# Patient Record
Sex: FEMALE | Race: WHITE | NOT HISPANIC OR LATINO | Employment: UNEMPLOYED | URBAN - METROPOLITAN AREA
[De-identification: names, ages, dates, MRNs, and addresses within clinical notes are randomized per-mention and may not be internally consistent; named-entity substitution may affect disease eponyms.]

---

## 2017-04-10 ENCOUNTER — ALLSCRIPTS OFFICE VISIT (OUTPATIENT)
Dept: OTHER | Facility: OTHER | Age: 52
End: 2017-04-10

## 2017-05-31 ENCOUNTER — APPOINTMENT (OUTPATIENT)
Dept: LAB | Facility: MEDICAL CENTER | Age: 52
End: 2017-05-31
Payer: COMMERCIAL

## 2017-05-31 DIAGNOSIS — E78.5 HYPERLIPIDEMIA: ICD-10-CM

## 2017-05-31 DIAGNOSIS — E03.9 HYPOTHYROIDISM: ICD-10-CM

## 2017-05-31 LAB
ALBUMIN SERPL BCP-MCNC: 3.7 G/DL (ref 3.5–5)
ALP SERPL-CCNC: 59 U/L (ref 46–116)
ALT SERPL W P-5'-P-CCNC: 30 U/L (ref 12–78)
ANION GAP SERPL CALCULATED.3IONS-SCNC: 3 MMOL/L (ref 4–13)
AST SERPL W P-5'-P-CCNC: 17 U/L (ref 5–45)
BILIRUB SERPL-MCNC: 0.51 MG/DL (ref 0.2–1)
BUN SERPL-MCNC: 11 MG/DL (ref 5–25)
CALCIUM SERPL-MCNC: 9.1 MG/DL (ref 8.3–10.1)
CHLORIDE SERPL-SCNC: 104 MMOL/L (ref 100–108)
CHOLEST SERPL-MCNC: 228 MG/DL (ref 50–200)
CO2 SERPL-SCNC: 30 MMOL/L (ref 21–32)
CREAT SERPL-MCNC: 0.69 MG/DL (ref 0.6–1.3)
GFR SERPL CREATININE-BSD FRML MDRD: >60 ML/MIN/1.73SQ M
GLUCOSE P FAST SERPL-MCNC: 94 MG/DL (ref 65–99)
HDLC SERPL-MCNC: 48 MG/DL (ref 40–60)
LDLC SERPL CALC-MCNC: 147 MG/DL (ref 0–100)
POTASSIUM SERPL-SCNC: 4.3 MMOL/L (ref 3.5–5.3)
PROT SERPL-MCNC: 7.7 G/DL (ref 6.4–8.2)
SODIUM SERPL-SCNC: 137 MMOL/L (ref 136–145)
TRIGL SERPL-MCNC: 166 MG/DL
TSH SERPL DL<=0.05 MIU/L-ACNC: 2.26 UIU/ML (ref 0.36–3.74)

## 2017-05-31 PROCEDURE — 80061 LIPID PANEL: CPT

## 2017-05-31 PROCEDURE — 80053 COMPREHEN METABOLIC PANEL: CPT

## 2017-05-31 PROCEDURE — 84443 ASSAY THYROID STIM HORMONE: CPT

## 2017-05-31 PROCEDURE — 36415 COLL VENOUS BLD VENIPUNCTURE: CPT

## 2017-08-17 ENCOUNTER — ALLSCRIPTS OFFICE VISIT (OUTPATIENT)
Dept: OTHER | Facility: OTHER | Age: 52
End: 2017-08-17

## 2017-11-01 DIAGNOSIS — Z12.31 ENCOUNTER FOR SCREENING MAMMOGRAM FOR MALIGNANT NEOPLASM OF BREAST: ICD-10-CM

## 2017-11-01 DIAGNOSIS — E66.01 MORBID (SEVERE) OBESITY DUE TO EXCESS CALORIES (HCC): ICD-10-CM

## 2017-11-01 DIAGNOSIS — E78.5 HYPERLIPIDEMIA: ICD-10-CM

## 2017-11-01 DIAGNOSIS — E03.9 HYPOTHYROIDISM: ICD-10-CM

## 2017-11-03 ENCOUNTER — HOSPITAL ENCOUNTER (OUTPATIENT)
Dept: RADIOLOGY | Facility: MEDICAL CENTER | Age: 52
Discharge: HOME | End: 2017-11-03
Payer: COMMERCIAL

## 2017-11-03 DIAGNOSIS — Z12.31 ENCOUNTER FOR SCREENING MAMMOGRAM FOR MALIGNANT NEOPLASM OF BREAST: ICD-10-CM

## 2017-11-03 PROCEDURE — G0202 SCR MAMMO BI INCL CAD: HCPCS

## 2018-01-12 NOTE — RESULT NOTES
Verified Results  (1) LYME ANTIBODY PROFILE W/REFLEX TO WESTERN BLOT [id number] 11:31AM Dara Liz Order Number: [id number]     Test Name Result Flag Reference   LYME IGG 0 17  0 00-0 79   NEGATIVE(0 00-0 79)-Absence of detectable Borrelia IgG Antibodies  A negative result does not exclude the possibility of Borrelia infection  If early Lyme disease is suspected,a second sample should be collected & tested 4 weeks after initial testing  LYME IGM 0 26  0 00-0 79   NEGATIVE (0 00-0 79)-Absence of detectable Borrelia IgM antibodies  A negative result does not exclude the possibility of Borrelia infection  If early lyme disease is suspected, a second sample should be collected & tested 4 weeks after initial testing

## 2018-01-13 VITALS
HEART RATE: 104 BPM | DIASTOLIC BLOOD PRESSURE: 84 MMHG | TEMPERATURE: 97.9 F | SYSTOLIC BLOOD PRESSURE: 140 MMHG | BODY MASS INDEX: 49.89 KG/M2 | OXYGEN SATURATION: 97 % | RESPIRATION RATE: 16 BRPM | WEIGHT: 292.2 LBS | HEIGHT: 64 IN

## 2018-01-13 VITALS
HEIGHT: 64 IN | DIASTOLIC BLOOD PRESSURE: 90 MMHG | SYSTOLIC BLOOD PRESSURE: 140 MMHG | BODY MASS INDEX: 50.02 KG/M2 | WEIGHT: 293 LBS

## 2018-02-12 DIAGNOSIS — E03.9 HYPOTHYROIDISM, UNSPECIFIED TYPE: Primary | ICD-10-CM

## 2018-02-12 RX ORDER — LEVOTHYROXINE SODIUM 88 UG/1
TABLET ORAL
Qty: 30 TABLET | Refills: 1 | Status: SHIPPED | OUTPATIENT
Start: 2018-02-12 | End: 2018-10-10 | Stop reason: SDUPTHER

## 2018-04-16 ENCOUNTER — ANNUAL EXAM (OUTPATIENT)
Dept: OBGYN CLINIC | Facility: MEDICAL CENTER | Age: 53
End: 2018-04-16
Payer: COMMERCIAL

## 2018-04-16 VITALS
BODY MASS INDEX: 48.82 KG/M2 | HEIGHT: 65 IN | DIASTOLIC BLOOD PRESSURE: 92 MMHG | SYSTOLIC BLOOD PRESSURE: 148 MMHG | WEIGHT: 293 LBS

## 2018-04-16 DIAGNOSIS — Z01.419 ENCNTR FOR GYN EXAM (GENERAL) (ROUTINE) W/O ABN FINDINGS: ICD-10-CM

## 2018-04-16 DIAGNOSIS — Z12.31 ENCOUNTER FOR SCREENING MAMMOGRAM FOR MALIGNANT NEOPLASM OF BREAST: ICD-10-CM

## 2018-04-16 PROCEDURE — S0612 ANNUAL GYNECOLOGICAL EXAMINA: HCPCS | Performed by: PHYSICIAN ASSISTANT

## 2018-04-16 RX ORDER — CHLORAL HYDRATE 500 MG
1000 CAPSULE ORAL DAILY
COMMUNITY
End: 2022-03-29

## 2018-04-16 RX ORDER — LOVASTATIN 10 MG/1
TABLET ORAL
COMMUNITY
Start: 2018-03-03 | End: 2018-09-14 | Stop reason: SDUPTHER

## 2018-04-16 NOTE — PROGRESS NOTES
Jessie Pate Markleton  1965      CC:  Yearly exam    S:  46 y o  female here for yearly exam  Her cycles are regular, not heavy or crampy  She is sexually active  She uses nothing for contraception  Last Pap 2015 neg/neg  Last Mammo 11/3/17 neg  Last colonoscopy 2016 neg      Current Outpatient Prescriptions:     cholecalciferol (VITAMIN D3) 1,000 units tablet, Take by mouth, Disp: , Rfl:     Coenzyme Q10 (CO Q 10) 100 MG CAPS, Take by mouth, Disp: , Rfl:     levothyroxine 88 mcg tablet, TAKE 1 TABLET BY MOUTH ONCE DAILY, Disp: 30 tablet, Rfl: 1    lovastatin (MEVACOR) 10 MG tablet, , Disp: , Rfl:     MULTIPLE VITAMIN-FOLIC ACID PO, Take by mouth, Disp: , Rfl:     Omega-3 Fatty Acids (FISH OIL) 1,000 mg, Take 1,000 mg by mouth daily, Disp: , Rfl:   Social History     Social History    Marital status: /Civil Union     Spouse name: N/A    Number of children: N/A    Years of education: N/A     Occupational History    Not on file       Social History Main Topics    Smoking status: Never Smoker    Smokeless tobacco: Never Used    Alcohol use Yes      Comment: SOCIAL    Drug use: No    Sexual activity: Yes     Partners: Male     Other Topics Concern    Not on file     Social History Narrative    DAILY COFFEE CONSUMPTION (1 CUPS/DAY)    USES SAFETY EQUIPMENT - [employer]     Family History   Problem Relation Age of Onset   Hortencia Millernce Breast cancer Mother     Hypertension Father     Colon cancer Sister     Heart disease Sister     Hypertension Sister     Diabetes Maternal Grandmother     Arthritis Family     Leukemia Family     Lung cancer Family     Other Family      SKIN DISORDER    Thyroid disease Family     Uterine cancer Family     Leukemia Sister     Cervical cancer Sister       Past Medical History:   Diagnosis Date    Arthritis     Hypercholesterolemia     Hypothyroidism         O:  Blood pressure 148/92, height 5' 4 57" (1 64 m), weight (!) 137 kg (303 lb), last menstrual period 03/18/2018  Patient appears well and is not in distress  Neck is supple without masses  Breasts are symmetrical without mass, tenderness, nipple discharge, skin changes or adenopathy  Abdomen is soft and nontender without masses  External genitals are normal without lesions or rashes  Vagina is normal without discharge or bleeding  Cervix is normal without discharge or lesion  Uterus is normal, mobile, nontender without palpable mass  Adnexa are normal, nontender, without palpable mass  A:  Yearly exam      P:   Pap due 2020   Mammo slip provided    RTO one year for yearly exam or sooner as needed

## 2018-05-04 ENCOUNTER — APPOINTMENT (OUTPATIENT)
Dept: LAB | Facility: MEDICAL CENTER | Age: 53
End: 2018-05-04
Payer: COMMERCIAL

## 2018-05-04 DIAGNOSIS — E03.9 HYPOTHYROIDISM: ICD-10-CM

## 2018-05-04 DIAGNOSIS — E78.5 HYPERLIPIDEMIA: ICD-10-CM

## 2018-05-04 DIAGNOSIS — E66.01 MORBID (SEVERE) OBESITY DUE TO EXCESS CALORIES (HCC): ICD-10-CM

## 2018-05-04 LAB
ALBUMIN SERPL BCP-MCNC: 3.8 G/DL (ref 3.5–5)
ALP SERPL-CCNC: 58 U/L (ref 46–116)
ALT SERPL W P-5'-P-CCNC: 26 U/L (ref 12–78)
ANION GAP SERPL CALCULATED.3IONS-SCNC: 7 MMOL/L (ref 4–13)
AST SERPL W P-5'-P-CCNC: 16 U/L (ref 5–45)
BILIRUB SERPL-MCNC: 0.67 MG/DL (ref 0.2–1)
BUN SERPL-MCNC: 9 MG/DL (ref 5–25)
CALCIUM SERPL-MCNC: 8.6 MG/DL (ref 8.3–10.1)
CHLORIDE SERPL-SCNC: 101 MMOL/L (ref 100–108)
CHOLEST SERPL-MCNC: 175 MG/DL (ref 50–200)
CO2 SERPL-SCNC: 28 MMOL/L (ref 21–32)
CREAT SERPL-MCNC: 0.68 MG/DL (ref 0.6–1.3)
GFR SERPL CREATININE-BSD FRML MDRD: 101 ML/MIN/1.73SQ M
GLUCOSE P FAST SERPL-MCNC: 89 MG/DL (ref 65–99)
HDLC SERPL-MCNC: 39 MG/DL (ref 40–60)
LDLC SERPL CALC-MCNC: 100 MG/DL (ref 0–100)
NONHDLC SERPL-MCNC: 136 MG/DL
POTASSIUM SERPL-SCNC: 4 MMOL/L (ref 3.5–5.3)
PROT SERPL-MCNC: 7.6 G/DL (ref 6.4–8.2)
SODIUM SERPL-SCNC: 136 MMOL/L (ref 136–145)
TRIGL SERPL-MCNC: 182 MG/DL
TSH SERPL DL<=0.05 MIU/L-ACNC: 2.53 UIU/ML (ref 0.36–3.74)

## 2018-05-04 PROCEDURE — 36415 COLL VENOUS BLD VENIPUNCTURE: CPT

## 2018-05-04 PROCEDURE — 80053 COMPREHEN METABOLIC PANEL: CPT

## 2018-05-04 PROCEDURE — 84443 ASSAY THYROID STIM HORMONE: CPT

## 2018-05-04 PROCEDURE — 80061 LIPID PANEL: CPT

## 2018-05-07 ENCOUNTER — TELEPHONE (OUTPATIENT)
Dept: FAMILY MEDICINE CLINIC | Facility: CLINIC | Age: 53
End: 2018-05-07

## 2018-05-07 NOTE — TELEPHONE ENCOUNTER
Left message to schedule      ----- Message from Rico Cottrlel PA-C sent at 5/7/2018  8:30 AM EDT -----  Labs   Are in    Pt  Due for  Routine  f/u  appt

## 2018-05-11 ENCOUNTER — OFFICE VISIT (OUTPATIENT)
Dept: FAMILY MEDICINE CLINIC | Facility: CLINIC | Age: 53
End: 2018-05-11
Payer: COMMERCIAL

## 2018-05-11 VITALS
HEIGHT: 65 IN | SYSTOLIC BLOOD PRESSURE: 118 MMHG | TEMPERATURE: 97.2 F | WEIGHT: 293 LBS | DIASTOLIC BLOOD PRESSURE: 80 MMHG | OXYGEN SATURATION: 97 % | BODY MASS INDEX: 48.82 KG/M2 | HEART RATE: 100 BPM

## 2018-05-11 DIAGNOSIS — E78.2 MIXED HYPERLIPIDEMIA: ICD-10-CM

## 2018-05-11 DIAGNOSIS — E03.9 HYPOTHYROIDISM, UNSPECIFIED TYPE: Primary | ICD-10-CM

## 2018-05-11 PROCEDURE — 1036F TOBACCO NON-USER: CPT | Performed by: PHYSICIAN ASSISTANT

## 2018-05-11 PROCEDURE — 99214 OFFICE O/P EST MOD 30 MIN: CPT | Performed by: PHYSICIAN ASSISTANT

## 2018-05-11 NOTE — PROGRESS NOTES
Assessment/Plan:         Diagnoses and all orders for this visit:    Hypothyroidism, unspecified type  Comments:    Continue levothyroxine 88 mcg follow-up 6 months with a TSH  Orders:  -     TSH, 3rd generation; Future    Mixed hyperlipidemia  Comments:    Continue low-fat diet  Hold statin for now patient to call in 2-3 weeks  May need to change to Crestor  Orders:  -     Comprehensive metabolic panel; Future  -     Lipid panel; Future          Subjective:      Patient ID: Aby Gonzalez is a 46 y o  female  Patient presents for follow up chronic conditions  Patient has hyper lipidemia  She is currently on lovastatin 10 mg  Patient states she started with some myalgias in her arms and legs and stop her lovastatin last week patient has noticed interval improvement in the aches  Patient to stay off lovastatin for now call in a week or 2  May try lovastatin 3 to 4 times a week or switch to Crestor  Patient is also intolerant to pravastatin  Her lipid panel revealed a total cholesterol 175 triglycerides 182 HDL was 39 and LDL was 100 CMP was normal   The patient also has hypothyroidism she is on levothyroxine 88 mcg  Her current TSH is at goal         The following portions of the patient's history were reviewed and updated as appropriate:   She  has a past medical history of Arthritis; Hypercholesterolemia; and Hypothyroidism    She   Patient Active Problem List    Diagnosis Date Noted    Morbid obesity ([facility] [address] ) 05/19/2016    Multiple joint pain 05/19/2016    Hyperlipidemia 05/07/2013    Hypothyroidism 05/07/2013    Vitamin D deficiency 05/07/2013     Current Outpatient Prescriptions   Medication Sig Dispense Refill    cholecalciferol (VITAMIN D3) 1,000 units tablet Take by mouth      Coenzyme Q10 (CO Q 10) 100 MG CAPS Take by mouth      levothyroxine 88 mcg tablet TAKE 1 TABLET BY MOUTH ONCE DAILY 30 tablet 1    lovastatin (MEVACOR) 10 MG tablet       MULTIPLE VITAMIN-FOLIC ACID PO Take by mouth      Omega-3 Fatty Acids (FISH OIL) 1,000 mg Take 1,000 mg by mouth daily       No current facility-administered medications for this visit  Current Outpatient Prescriptions on File Prior to Visit   Medication Sig    cholecalciferol (VITAMIN D3) 1,000 units tablet Take by mouth    Coenzyme Q10 (CO Q 10) 100 MG CAPS Take by mouth    levothyroxine 88 mcg tablet TAKE 1 TABLET BY MOUTH ONCE DAILY    lovastatin (MEVACOR) 10 MG tablet     MULTIPLE VITAMIN-FOLIC ACID PO Take by mouth    Omega-3 Fatty Acids (FISH OIL) 1,000 mg Take 1,000 mg by mouth daily     No current facility-administered medications on file prior to visit  She is allergic to penicillins       Review of Systems   Constitutional: Negative for activity change, fatigue, fever and unexpected weight change  HENT: Positive for postnasal drip and sore throat  Negative for ear pain  Respiratory: Negative for cough and shortness of breath  Cardiovascular: Negative for chest pain and leg swelling  Gastrointestinal: Negative for abdominal pain, blood in stool and constipation  Endocrine: Negative for cold intolerance and heat intolerance  Genitourinary: Negative for difficulty urinating  Musculoskeletal: Positive for arthralgias and myalgias  Pt  Held  Her  Statin  Since  friday  Due  To  Arm  Pain  Pt  Noticed  interrval  Improvement  Will  Call in  2  Weeks  May  Need  To try  Different statin   Skin: Negative for rash  Neurological: Negative for dizziness, syncope and headaches  Psychiatric/Behavioral: Negative for sleep disturbance  Objective:      /80 (BP Location: Left arm, Patient Position: Sitting, Cuff Size: Standard)   Pulse 100   Temp (!) 97 2 °F (36 2 °C)   Ht 5' 5" (1 651 m)   Wt (!) 137 kg (302 lb)   SpO2 97%   BMI 50 26 kg/m²          Physical Exam   Constitutional: She is oriented to person, place, and time  She appears well-developed and well-nourished     Morbidly obese  White female   HENT:   Head: Normocephalic and atraumatic  Right Ear: External ear normal    Left Ear: External ear normal    Mouth/Throat: Oropharynx is clear and moist    Eyes: Conjunctivae are normal  Pupils are equal, round, and reactive to light  Neck: Carotid bruit is not present  No thyromegaly present  Cardiovascular: Normal rate and regular rhythm  No murmur heard  Pulmonary/Chest: Effort normal and breath sounds normal    Abdominal: Soft  Bowel sounds are normal  She exhibits mass (umbilical hernia)  Musculoskeletal: She exhibits no edema  Lymphadenopathy:     She has no cervical adenopathy  Neurological: She is alert and oriented to person, place, and time  Skin: Skin is warm and dry  Psychiatric: She has a normal mood and affect  Her behavior is normal  Thought content normal    Nursing note and vitals reviewed

## 2018-09-14 ENCOUNTER — TELEPHONE (OUTPATIENT)
Dept: FAMILY MEDICINE CLINIC | Facility: CLINIC | Age: 53
End: 2018-09-14

## 2018-09-14 DIAGNOSIS — E78.2 MIXED HYPERLIPIDEMIA: Primary | ICD-10-CM

## 2018-09-14 RX ORDER — LOVASTATIN 10 MG/1
10 TABLET ORAL
Qty: 90 TABLET | Refills: 1 | Status: SHIPPED | OUTPATIENT
Start: 2018-09-14 | End: 2019-06-09 | Stop reason: SDUPTHER

## 2018-09-14 NOTE — TELEPHONE ENCOUNTER
Patient doesn't feel like the medication was causing the issues  The problem is seeming to get worse  Pain in legs  What do you suggest she do now

## 2018-09-14 NOTE — TELEPHONE ENCOUNTER
Spoke  With  Pt      She  Has  Been  Off  Statin  For 4  Months  Pain  In  Legs and  Back  Worse  Pt  Advised  F/u  Appt  She  Agrees    Pt  May resume  Lovastatin  10 mg

## 2018-09-21 ENCOUNTER — APPOINTMENT (OUTPATIENT)
Dept: LAB | Facility: MEDICAL CENTER | Age: 53
End: 2018-09-21
Payer: COMMERCIAL

## 2018-09-21 ENCOUNTER — OFFICE VISIT (OUTPATIENT)
Dept: FAMILY MEDICINE CLINIC | Facility: CLINIC | Age: 53
End: 2018-09-21
Payer: COMMERCIAL

## 2018-09-21 VITALS
BODY MASS INDEX: 48.82 KG/M2 | TEMPERATURE: 99.3 F | RESPIRATION RATE: 16 BRPM | DIASTOLIC BLOOD PRESSURE: 74 MMHG | HEIGHT: 65 IN | SYSTOLIC BLOOD PRESSURE: 144 MMHG | WEIGHT: 293 LBS | OXYGEN SATURATION: 99 % | HEART RATE: 88 BPM

## 2018-09-21 DIAGNOSIS — G89.29 CHRONIC PAIN OF MULTIPLE JOINTS: Primary | ICD-10-CM

## 2018-09-21 DIAGNOSIS — G89.29 CHRONIC PAIN OF MULTIPLE JOINTS: ICD-10-CM

## 2018-09-21 DIAGNOSIS — M25.50 CHRONIC PAIN OF MULTIPLE JOINTS: Primary | ICD-10-CM

## 2018-09-21 DIAGNOSIS — M25.50 CHRONIC PAIN OF MULTIPLE JOINTS: ICD-10-CM

## 2018-09-21 LAB — ERYTHROCYTE [SEDIMENTATION RATE] IN BLOOD: 22 MM/HOUR (ref 0–20)

## 2018-09-21 PROCEDURE — 86038 ANTINUCLEAR ANTIBODIES: CPT

## 2018-09-21 PROCEDURE — 85652 RBC SED RATE AUTOMATED: CPT

## 2018-09-21 PROCEDURE — 3008F BODY MASS INDEX DOCD: CPT | Performed by: PHYSICIAN ASSISTANT

## 2018-09-21 PROCEDURE — 86618 LYME DISEASE ANTIBODY: CPT

## 2018-09-21 PROCEDURE — 99213 OFFICE O/P EST LOW 20 MIN: CPT | Performed by: PHYSICIAN ASSISTANT

## 2018-09-21 PROCEDURE — 36415 COLL VENOUS BLD VENIPUNCTURE: CPT

## 2018-09-21 PROCEDURE — 86430 RHEUMATOID FACTOR TEST QUAL: CPT

## 2018-09-21 RX ORDER — ACETAMINOPHEN 325 MG/1
650 TABLET ORAL EVERY 6 HOURS PRN
COMMUNITY
End: 2019-04-19 | Stop reason: ALTCHOICE

## 2018-09-21 NOTE — PROGRESS NOTES
Assessment/Plan:     Diagnoses and all orders for this visit:    Chronic pain of multiple joints  Comments:    Suspect osteoarthritis  Will check arthritis panel  Patient to start over-the-counter Tylenol as needed  Orders:  -     WILLIAM Screen w/ Reflex to Titer/Pattern; Future  -     RF Screen w/ Reflex to Titer; Future  -     Sedimentation rate, automated; Future  -     Lyme Antibody Profile with reflex to WB; Future    Other orders  -     acetaminophen (TYLENOL) 325 mg tablet; Take 650 mg by mouth every 6 (six) hours as needed          Subjective:      Patient ID: Mervat Walker is a 46 y o  female  Patient presents for follow-up continued body aches  Patient had stopped her statin way back in May  Patient states she still has pains in her arms and legs  She mostly her left lower back left hip and down her left leg  Patient takes ibuprofen intermittently with some relief  No rashes  No specific joint swelling  Patient states when she 1st get up in the morning she is very stiff  The following portions of the patient's history were reviewed and updated as appropriate:   She  has a past medical history of Arthritis; Hypercholesterolemia; and Hypothyroidism    She   Patient Active Problem List    Diagnosis Date Noted    Chronic pain of multiple joints 09/21/2018    Morbid obesity ([address] ) 05/19/2016    Multiple joint pain 05/19/2016    Hyperlipidemia 05/07/2013    Hypothyroidism 05/07/2013    Vitamin D deficiency 05/07/2013     Current Outpatient Prescriptions   Medication Sig Dispense Refill    acetaminophen (TYLENOL) 325 mg tablet Take 650 mg by mouth every 6 (six) hours as needed      cholecalciferol (VITAMIN D3) 1,000 units tablet Take by mouth      Coenzyme Q10 (CO Q 10) 100 MG CAPS Take by mouth      levothyroxine 88 mcg tablet TAKE 1 TABLET BY MOUTH ONCE DAILY 30 tablet 1    lovastatin (MEVACOR) 10 MG tablet Take 1 tablet (10 mg total) by mouth daily at bedtime 90 tablet 1    MULTIPLE VITAMIN-FOLIC ACID PO Take by mouth      Omega-3 Fatty Acids (FISH OIL) 1,000 mg Take 1,000 mg by mouth daily       No current facility-administered medications for this visit  Current Outpatient Prescriptions on File Prior to Visit   Medication Sig    cholecalciferol (VITAMIN D3) 1,000 units tablet Take by mouth    Coenzyme Q10 (CO Q 10) 100 MG CAPS Take by mouth    levothyroxine 88 mcg tablet TAKE 1 TABLET BY MOUTH ONCE DAILY    lovastatin (MEVACOR) 10 MG tablet Take 1 tablet (10 mg total) by mouth daily at bedtime    MULTIPLE VITAMIN-FOLIC ACID PO Take by mouth    Omega-3 Fatty Acids (FISH OIL) 1,000 mg Take 1,000 mg by mouth daily     No current facility-administered medications on file prior to visit  She is allergic to penicillins       Review of Systems   Constitutional: Negative for activity change, appetite change, fever and unexpected weight change  HENT: Negative for ear pain and sore throat  Eyes: Negative for visual disturbance  Respiratory: Negative for cough, shortness of breath and wheezing  Cardiovascular: Negative for chest pain and leg swelling  Gastrointestinal: Negative for abdominal pain, blood in stool, constipation, diarrhea, nausea and vomiting  Genitourinary: Negative for difficulty urinating  Musculoskeletal: Positive for arthralgias and back pain  Negative for myalgias  Skin: Negative for rash  Neurological: Negative for dizziness, syncope, light-headedness and headaches  Psychiatric/Behavioral: Negative for self-injury, sleep disturbance and suicidal ideas  The patient is not nervous/anxious  Objective:        Physical Exam   Constitutional: She is oriented to person, place, and time  She appears well-developed and well-nourished  Morbidly  Obese  White  female   HENT:   Right Ear: External ear normal    Left Ear: External ear normal    Eyes: Pupils are equal, round, and reactive to light     Cardiovascular: Normal rate, regular rhythm and normal heart sounds  No murmur heard  Pulmonary/Chest: Effort normal and breath sounds normal    Musculoskeletal: She exhibits tenderness  Patient has multiple joint tenderness  The patient has osteoarthritis changes of her D IP joints fingers some on her toes  No redness or effusions seen  Patient has tenderness bilateral lower lumbar region  Hips are tender as well  Patient has good range of motion in all her joints the some crepitus and discomfort  Patient recalls no family history of rheumatoid arthritis  Neurological: She is alert and oriented to person, place, and time  Skin: Skin is dry  No rash noted  Psychiatric: She has a normal mood and affect  Her behavior is normal  Judgment and thought content normal    Nursing note and vitals reviewed

## 2018-09-23 LAB
B BURGDOR IGG SER IA-ACNC: 0.18
B BURGDOR IGM SER IA-ACNC: 0.2

## 2018-09-24 LAB
RHEUMATOID FACT SER QL LA: NEGATIVE
RYE IGE QN: NEGATIVE

## 2018-09-26 ENCOUNTER — TELEPHONE (OUTPATIENT)
Dept: FAMILY MEDICINE CLINIC | Facility: CLINIC | Age: 53
End: 2018-09-26

## 2018-09-26 NOTE — TELEPHONE ENCOUNTER
----- Message from Bishop Nabil PA-C sent at 9/24/2018  1:00 PM EDT -----  Call  Pt  She  Has  No  Rheumatoid  Arthritis  Only  Osteo    Lyme's  Is  Negative too

## 2018-10-10 DIAGNOSIS — E03.9 HYPOTHYROIDISM, UNSPECIFIED TYPE: ICD-10-CM

## 2018-10-10 RX ORDER — LEVOTHYROXINE SODIUM 88 UG/1
TABLET ORAL
Qty: 90 TABLET | Refills: 1 | Status: SHIPPED | OUTPATIENT
Start: 2018-10-10 | End: 2019-04-08 | Stop reason: SDUPTHER

## 2018-11-09 ENCOUNTER — HOSPITAL ENCOUNTER (OUTPATIENT)
Dept: RADIOLOGY | Facility: MEDICAL CENTER | Age: 53
Discharge: HOME | End: 2018-11-09
Payer: COMMERCIAL

## 2018-11-09 DIAGNOSIS — Z12.31 ENCOUNTER FOR SCREENING MAMMOGRAM FOR MALIGNANT NEOPLASM OF BREAST: ICD-10-CM

## 2018-11-09 PROCEDURE — 77067 SCR MAMMO BI INCL CAD: CPT

## 2019-04-08 DIAGNOSIS — E03.9 HYPOTHYROIDISM, UNSPECIFIED TYPE: ICD-10-CM

## 2019-04-09 RX ORDER — LEVOTHYROXINE SODIUM 88 UG/1
TABLET ORAL
Qty: 90 TABLET | Refills: 0 | Status: SHIPPED | OUTPATIENT
Start: 2019-04-09 | End: 2019-04-19 | Stop reason: ALTCHOICE

## 2019-04-10 ENCOUNTER — APPOINTMENT (OUTPATIENT)
Dept: LAB | Facility: MEDICAL CENTER | Age: 54
End: 2019-04-10
Payer: COMMERCIAL

## 2019-04-10 DIAGNOSIS — E03.9 HYPOTHYROIDISM, UNSPECIFIED TYPE: ICD-10-CM

## 2019-04-10 DIAGNOSIS — E78.2 MIXED HYPERLIPIDEMIA: ICD-10-CM

## 2019-04-10 LAB
ALBUMIN SERPL BCP-MCNC: 3.7 G/DL (ref 3.5–5)
ALP SERPL-CCNC: 63 U/L (ref 46–116)
ALT SERPL W P-5'-P-CCNC: 24 U/L (ref 12–78)
ANION GAP SERPL CALCULATED.3IONS-SCNC: 4 MMOL/L (ref 4–13)
AST SERPL W P-5'-P-CCNC: 14 U/L (ref 5–45)
BILIRUB SERPL-MCNC: 0.65 MG/DL (ref 0.2–1)
BUN SERPL-MCNC: 11 MG/DL (ref 5–25)
CALCIUM SERPL-MCNC: 8.3 MG/DL (ref 8.3–10.1)
CHLORIDE SERPL-SCNC: 103 MMOL/L (ref 100–108)
CHOLEST SERPL-MCNC: 190 MG/DL (ref 50–200)
CO2 SERPL-SCNC: 27 MMOL/L (ref 21–32)
CREAT SERPL-MCNC: 0.8 MG/DL (ref 0.6–1.3)
GFR SERPL CREATININE-BSD FRML MDRD: 84 ML/MIN/1.73SQ M
GLUCOSE P FAST SERPL-MCNC: 87 MG/DL (ref 65–99)
HDLC SERPL-MCNC: 38 MG/DL (ref 40–60)
LDLC SERPL CALC-MCNC: 124 MG/DL (ref 0–100)
NONHDLC SERPL-MCNC: 152 MG/DL
POTASSIUM SERPL-SCNC: 4 MMOL/L (ref 3.5–5.3)
PROT SERPL-MCNC: 7.6 G/DL (ref 6.4–8.2)
SODIUM SERPL-SCNC: 134 MMOL/L (ref 136–145)
TRIGL SERPL-MCNC: 142 MG/DL
TSH SERPL DL<=0.05 MIU/L-ACNC: 2.46 UIU/ML (ref 0.36–3.74)

## 2019-04-10 PROCEDURE — 80053 COMPREHEN METABOLIC PANEL: CPT

## 2019-04-10 PROCEDURE — 36415 COLL VENOUS BLD VENIPUNCTURE: CPT

## 2019-04-10 PROCEDURE — 84443 ASSAY THYROID STIM HORMONE: CPT

## 2019-04-10 PROCEDURE — 80061 LIPID PANEL: CPT

## 2019-04-19 ENCOUNTER — OFFICE VISIT (OUTPATIENT)
Dept: FAMILY MEDICINE CLINIC | Facility: CLINIC | Age: 54
End: 2019-04-19
Payer: COMMERCIAL

## 2019-04-19 ENCOUNTER — APPOINTMENT (OUTPATIENT)
Dept: RADIOLOGY | Facility: MEDICAL CENTER | Age: 54
End: 2019-04-19
Payer: COMMERCIAL

## 2019-04-19 VITALS
OXYGEN SATURATION: 96 % | HEIGHT: 65 IN | BODY MASS INDEX: 48.82 KG/M2 | TEMPERATURE: 99.1 F | RESPIRATION RATE: 16 BRPM | WEIGHT: 293 LBS | DIASTOLIC BLOOD PRESSURE: 80 MMHG | SYSTOLIC BLOOD PRESSURE: 140 MMHG | HEART RATE: 81 BPM

## 2019-04-19 DIAGNOSIS — M25.551 BILATERAL HIP PAIN: ICD-10-CM

## 2019-04-19 DIAGNOSIS — E78.2 MIXED HYPERLIPIDEMIA: ICD-10-CM

## 2019-04-19 DIAGNOSIS — M25.552 BILATERAL HIP PAIN: ICD-10-CM

## 2019-04-19 DIAGNOSIS — R25.2 MUSCLE CRAMPS: ICD-10-CM

## 2019-04-19 DIAGNOSIS — E03.9 HYPOTHYROIDISM, UNSPECIFIED TYPE: Primary | ICD-10-CM

## 2019-04-19 DIAGNOSIS — E66.01 MORBID OBESITY (HCC): ICD-10-CM

## 2019-04-19 PROCEDURE — 73522 X-RAY EXAM HIPS BI 3-4 VIEWS: CPT

## 2019-04-19 PROCEDURE — 99214 OFFICE O/P EST MOD 30 MIN: CPT | Performed by: PHYSICIAN ASSISTANT

## 2019-04-19 PROCEDURE — 3008F BODY MASS INDEX DOCD: CPT | Performed by: PHYSICIAN ASSISTANT

## 2019-04-19 RX ORDER — LEVOTHYROXINE SODIUM 0.1 MG/1
100 TABLET ORAL DAILY
Qty: 90 TABLET | Refills: 1 | Status: SHIPPED | OUTPATIENT
Start: 2019-04-19 | End: 2019-10-15 | Stop reason: SDUPTHER

## 2019-04-19 RX ORDER — CALCIUM POLYCARBOPHIL 625 MG 625 MG/1
625 TABLET ORAL DAILY
COMMUNITY

## 2019-04-22 ENCOUNTER — ANNUAL EXAM (OUTPATIENT)
Dept: OBGYN CLINIC | Facility: MEDICAL CENTER | Age: 54
End: 2019-04-22
Payer: COMMERCIAL

## 2019-04-22 VITALS
BODY MASS INDEX: 48.82 KG/M2 | DIASTOLIC BLOOD PRESSURE: 90 MMHG | HEIGHT: 65 IN | SYSTOLIC BLOOD PRESSURE: 144 MMHG | WEIGHT: 293 LBS

## 2019-04-22 DIAGNOSIS — Z12.31 ENCOUNTER FOR SCREENING MAMMOGRAM FOR MALIGNANT NEOPLASM OF BREAST: ICD-10-CM

## 2019-04-22 DIAGNOSIS — Z01.419 ENCNTR FOR GYN EXAM (GENERAL) (ROUTINE) W/O ABN FINDINGS: ICD-10-CM

## 2019-04-22 PROCEDURE — S0612 ANNUAL GYNECOLOGICAL EXAMINA: HCPCS | Performed by: PHYSICIAN ASSISTANT

## 2019-06-09 DIAGNOSIS — E78.2 MIXED HYPERLIPIDEMIA: ICD-10-CM

## 2019-06-10 RX ORDER — LOVASTATIN 10 MG/1
TABLET ORAL
Qty: 90 TABLET | Refills: 1 | Status: SHIPPED | OUTPATIENT
Start: 2019-06-10 | End: 2019-12-12 | Stop reason: SDUPTHER

## 2019-08-07 ENCOUNTER — APPOINTMENT (OUTPATIENT)
Dept: LAB | Facility: MEDICAL CENTER | Age: 54
End: 2019-08-07
Payer: COMMERCIAL

## 2019-08-07 DIAGNOSIS — E03.9 HYPOTHYROIDISM, UNSPECIFIED TYPE: ICD-10-CM

## 2019-08-07 PROCEDURE — 84443 ASSAY THYROID STIM HORMONE: CPT

## 2019-08-07 PROCEDURE — 36415 COLL VENOUS BLD VENIPUNCTURE: CPT

## 2019-08-08 LAB — TSH SERPL DL<=0.05 MIU/L-ACNC: 2.08 UIU/ML (ref 0.36–3.74)

## 2019-10-15 DIAGNOSIS — E03.9 HYPOTHYROIDISM, UNSPECIFIED TYPE: ICD-10-CM

## 2019-10-15 RX ORDER — LEVOTHYROXINE SODIUM 0.1 MG/1
TABLET ORAL
Qty: 90 TABLET | Refills: 0 | Status: SHIPPED | OUTPATIENT
Start: 2019-10-15 | End: 2020-04-10 | Stop reason: SDUPTHER

## 2019-11-13 ENCOUNTER — HOSPITAL ENCOUNTER (OUTPATIENT)
Dept: RADIOLOGY | Facility: MEDICAL CENTER | Age: 54
Discharge: HOME | End: 2019-11-13
Payer: COMMERCIAL

## 2019-11-13 VITALS — HEIGHT: 64 IN | WEIGHT: 293 LBS | BODY MASS INDEX: 50.02 KG/M2

## 2019-11-13 DIAGNOSIS — Z12.31 ENCOUNTER FOR SCREENING MAMMOGRAM FOR MALIGNANT NEOPLASM OF BREAST: ICD-10-CM

## 2019-11-13 PROCEDURE — 77067 SCR MAMMO BI INCL CAD: CPT

## 2019-12-12 DIAGNOSIS — E78.2 MIXED HYPERLIPIDEMIA: ICD-10-CM

## 2019-12-12 RX ORDER — LOVASTATIN 10 MG/1
TABLET ORAL
Qty: 90 TABLET | Refills: 0 | Status: SHIPPED | OUTPATIENT
Start: 2019-12-12 | End: 2020-03-13

## 2019-12-24 DIAGNOSIS — E03.9 HYPOTHYROIDISM, UNSPECIFIED TYPE: ICD-10-CM

## 2019-12-24 RX ORDER — LEVOTHYROXINE SODIUM 0.1 MG/1
TABLET ORAL
Qty: 90 TABLET | Refills: 0 | Status: SHIPPED | OUTPATIENT
Start: 2019-12-24 | End: 2020-04-10 | Stop reason: SDUPTHER

## 2020-03-12 DIAGNOSIS — E78.2 MIXED HYPERLIPIDEMIA: ICD-10-CM

## 2020-03-13 ENCOUNTER — TELEPHONE (OUTPATIENT)
Dept: FAMILY MEDICINE CLINIC | Facility: CLINIC | Age: 55
End: 2020-03-13

## 2020-03-13 RX ORDER — LOVASTATIN 10 MG/1
TABLET ORAL
Qty: 30 TABLET | Refills: 0 | Status: SHIPPED | OUTPATIENT
Start: 2020-03-13 | End: 2020-08-18

## 2020-04-08 DIAGNOSIS — E03.9 HYPOTHYROIDISM, UNSPECIFIED TYPE: Primary | ICD-10-CM

## 2020-04-09 ENCOUNTER — APPOINTMENT (OUTPATIENT)
Dept: LAB | Facility: MEDICAL CENTER | Age: 55
End: 2020-04-09
Payer: COMMERCIAL

## 2020-04-09 DIAGNOSIS — E03.9 HYPOTHYROIDISM, UNSPECIFIED TYPE: ICD-10-CM

## 2020-04-09 LAB — TSH SERPL DL<=0.05 MIU/L-ACNC: 2.48 UIU/ML (ref 0.36–3.74)

## 2020-04-09 PROCEDURE — 84443 ASSAY THYROID STIM HORMONE: CPT

## 2020-04-09 PROCEDURE — 36415 COLL VENOUS BLD VENIPUNCTURE: CPT

## 2020-04-10 ENCOUNTER — TELEMEDICINE (OUTPATIENT)
Dept: FAMILY MEDICINE CLINIC | Facility: CLINIC | Age: 55
End: 2020-04-10
Payer: COMMERCIAL

## 2020-04-10 DIAGNOSIS — E66.01 MORBID OBESITY (HCC): ICD-10-CM

## 2020-04-10 DIAGNOSIS — E03.9 HYPOTHYROIDISM, UNSPECIFIED TYPE: Primary | ICD-10-CM

## 2020-04-10 DIAGNOSIS — E03.9 HYPOTHYROIDISM, UNSPECIFIED TYPE: ICD-10-CM

## 2020-04-10 DIAGNOSIS — E78.2 MIXED HYPERLIPIDEMIA: ICD-10-CM

## 2020-04-10 PROCEDURE — 99213 OFFICE O/P EST LOW 20 MIN: CPT | Performed by: PHYSICIAN ASSISTANT

## 2020-04-10 RX ORDER — LEVOTHYROXINE SODIUM 0.1 MG/1
100 TABLET ORAL DAILY
Qty: 90 TABLET | Refills: 0 | Status: SHIPPED | OUTPATIENT
Start: 2020-04-10 | End: 2020-08-18

## 2020-05-18 ENCOUNTER — ANNUAL EXAM (OUTPATIENT)
Dept: OBGYN CLINIC | Facility: MEDICAL CENTER | Age: 55
End: 2020-05-18
Payer: COMMERCIAL

## 2020-05-18 VITALS
SYSTOLIC BLOOD PRESSURE: 154 MMHG | HEIGHT: 65 IN | DIASTOLIC BLOOD PRESSURE: 80 MMHG | BODY MASS INDEX: 48.82 KG/M2 | WEIGHT: 293 LBS

## 2020-05-18 DIAGNOSIS — Z12.31 ENCOUNTER FOR SCREENING MAMMOGRAM FOR MALIGNANT NEOPLASM OF BREAST: ICD-10-CM

## 2020-05-18 DIAGNOSIS — Z01.419 ENCNTR FOR GYN EXAM (GENERAL) (ROUTINE) W/O ABN FINDINGS: ICD-10-CM

## 2020-05-18 PROCEDURE — 87624 HPV HI-RISK TYP POOLED RSLT: CPT | Performed by: PHYSICIAN ASSISTANT

## 2020-05-18 PROCEDURE — S0612 ANNUAL GYNECOLOGICAL EXAMINA: HCPCS | Performed by: PHYSICIAN ASSISTANT

## 2020-05-18 PROCEDURE — G0145 SCR C/V CYTO,THINLAYER,RESCR: HCPCS | Performed by: PHYSICIAN ASSISTANT

## 2020-05-18 PROCEDURE — 3008F BODY MASS INDEX DOCD: CPT | Performed by: PHYSICIAN ASSISTANT

## 2020-05-20 LAB
HPV HR 12 DNA CVX QL NAA+PROBE: NEGATIVE
HPV16 DNA CVX QL NAA+PROBE: NEGATIVE
HPV18 DNA CVX QL NAA+PROBE: NEGATIVE

## 2020-05-22 LAB
LAB AP GYN PRIMARY INTERPRETATION: NORMAL
Lab: NORMAL

## 2020-06-11 ENCOUNTER — OFFICE VISIT (OUTPATIENT)
Dept: FAMILY MEDICINE CLINIC | Facility: CLINIC | Age: 55
End: 2020-06-11
Payer: COMMERCIAL

## 2020-06-11 VITALS
TEMPERATURE: 97.5 F | WEIGHT: 293 LBS | SYSTOLIC BLOOD PRESSURE: 158 MMHG | DIASTOLIC BLOOD PRESSURE: 80 MMHG | HEART RATE: 112 BPM | HEIGHT: 65 IN | BODY MASS INDEX: 48.82 KG/M2

## 2020-06-11 DIAGNOSIS — I89.0 LYMPHEDEMA OF BOTH LOWER EXTREMITIES: Primary | ICD-10-CM

## 2020-06-11 PROCEDURE — 3008F BODY MASS INDEX DOCD: CPT | Performed by: PHYSICIAN ASSISTANT

## 2020-06-11 PROCEDURE — 99214 OFFICE O/P EST MOD 30 MIN: CPT | Performed by: PHYSICIAN ASSISTANT

## 2020-06-11 PROCEDURE — 1036F TOBACCO NON-USER: CPT | Performed by: PHYSICIAN ASSISTANT

## 2020-06-11 RX ORDER — FUROSEMIDE 20 MG/1
20 TABLET ORAL 2 TIMES DAILY
Qty: 60 TABLET | Refills: 0 | Status: SHIPPED | OUTPATIENT
Start: 2020-06-11 | End: 2021-06-07 | Stop reason: ALTCHOICE

## 2020-06-11 RX ORDER — POTASSIUM CHLORIDE 750 MG/1
10 CAPSULE, EXTENDED RELEASE ORAL 2 TIMES DAILY
Qty: 60 CAPSULE | Refills: 0 | Status: SHIPPED | OUTPATIENT
Start: 2020-06-11 | End: 2021-07-28

## 2020-06-16 ENCOUNTER — TELEPHONE (OUTPATIENT)
Dept: FAMILY MEDICINE CLINIC | Facility: CLINIC | Age: 55
End: 2020-06-16

## 2020-08-17 DIAGNOSIS — E03.9 HYPOTHYROIDISM, UNSPECIFIED TYPE: ICD-10-CM

## 2020-08-17 DIAGNOSIS — E78.2 MIXED HYPERLIPIDEMIA: ICD-10-CM

## 2020-08-18 RX ORDER — LEVOTHYROXINE SODIUM 0.1 MG/1
TABLET ORAL
Qty: 90 TABLET | Refills: 0 | Status: SHIPPED | OUTPATIENT
Start: 2020-08-18 | End: 2021-01-26

## 2020-08-18 RX ORDER — LOVASTATIN 10 MG/1
TABLET ORAL
Qty: 90 TABLET | Refills: 0 | Status: SHIPPED | OUTPATIENT
Start: 2020-08-18 | End: 2021-01-26

## 2020-11-12 ENCOUNTER — APPOINTMENT (OUTPATIENT)
Dept: LAB | Facility: MEDICAL CENTER | Age: 55
End: 2020-11-12
Payer: COMMERCIAL

## 2020-11-12 DIAGNOSIS — E78.2 MIXED HYPERLIPIDEMIA: ICD-10-CM

## 2020-11-12 DIAGNOSIS — E03.9 HYPOTHYROIDISM, UNSPECIFIED TYPE: ICD-10-CM

## 2020-11-12 LAB
ALBUMIN SERPL BCP-MCNC: 3.7 G/DL (ref 3.5–5)
ALP SERPL-CCNC: 71 U/L (ref 46–116)
ALT SERPL W P-5'-P-CCNC: 23 U/L (ref 12–78)
ANION GAP SERPL CALCULATED.3IONS-SCNC: 4 MMOL/L (ref 4–13)
AST SERPL W P-5'-P-CCNC: 9 U/L (ref 5–45)
BILIRUB SERPL-MCNC: 0.66 MG/DL (ref 0.2–1)
BUN SERPL-MCNC: 9 MG/DL (ref 5–25)
CALCIUM SERPL-MCNC: 9.2 MG/DL (ref 8.3–10.1)
CHLORIDE SERPL-SCNC: 103 MMOL/L (ref 100–108)
CHOLEST SERPL-MCNC: 185 MG/DL (ref 50–200)
CO2 SERPL-SCNC: 30 MMOL/L (ref 21–32)
CREAT SERPL-MCNC: 0.79 MG/DL (ref 0.6–1.3)
GFR SERPL CREATININE-BSD FRML MDRD: 85 ML/MIN/1.73SQ M
GLUCOSE P FAST SERPL-MCNC: 94 MG/DL (ref 65–99)
HDLC SERPL-MCNC: 40 MG/DL
LDLC SERPL CALC-MCNC: 115 MG/DL (ref 0–100)
NONHDLC SERPL-MCNC: 145 MG/DL
POTASSIUM SERPL-SCNC: 4.1 MMOL/L (ref 3.5–5.3)
PROT SERPL-MCNC: 7.9 G/DL (ref 6.4–8.2)
SODIUM SERPL-SCNC: 137 MMOL/L (ref 136–145)
TRIGL SERPL-MCNC: 151 MG/DL
TSH SERPL DL<=0.05 MIU/L-ACNC: 2.05 UIU/ML (ref 0.36–3.74)

## 2020-11-12 PROCEDURE — 80061 LIPID PANEL: CPT

## 2020-11-12 PROCEDURE — 80053 COMPREHEN METABOLIC PANEL: CPT

## 2020-11-12 PROCEDURE — 36415 COLL VENOUS BLD VENIPUNCTURE: CPT

## 2020-11-12 PROCEDURE — 84443 ASSAY THYROID STIM HORMONE: CPT

## 2021-01-26 DIAGNOSIS — E03.9 HYPOTHYROIDISM, UNSPECIFIED TYPE: ICD-10-CM

## 2021-01-26 DIAGNOSIS — E78.2 MIXED HYPERLIPIDEMIA: ICD-10-CM

## 2021-01-26 RX ORDER — LEVOTHYROXINE SODIUM 0.1 MG/1
TABLET ORAL
Qty: 90 TABLET | Refills: 0 | Status: SHIPPED | OUTPATIENT
Start: 2021-01-26 | End: 2021-06-20

## 2021-01-26 RX ORDER — LOVASTATIN 10 MG/1
TABLET ORAL
Qty: 90 TABLET | Refills: 0 | Status: SHIPPED | OUTPATIENT
Start: 2021-01-26 | End: 2021-06-20

## 2021-04-02 ENCOUNTER — HOSPITAL ENCOUNTER (OUTPATIENT)
Dept: RADIOLOGY | Facility: MEDICAL CENTER | Age: 56
Discharge: HOME | End: 2021-04-02
Payer: COMMERCIAL

## 2021-04-02 VITALS — HEIGHT: 65 IN | BODY MASS INDEX: 48.82 KG/M2 | WEIGHT: 293 LBS

## 2021-04-02 DIAGNOSIS — Z12.31 ENCOUNTER FOR SCREENING MAMMOGRAM FOR MALIGNANT NEOPLASM OF BREAST: ICD-10-CM

## 2021-04-02 PROCEDURE — 77067 SCR MAMMO BI INCL CAD: CPT

## 2021-04-02 PROCEDURE — 77063 BREAST TOMOSYNTHESIS BI: CPT

## 2021-06-07 ENCOUNTER — ANNUAL EXAM (OUTPATIENT)
Dept: OBGYN CLINIC | Facility: MEDICAL CENTER | Age: 56
End: 2021-06-07
Payer: COMMERCIAL

## 2021-06-07 VITALS
HEIGHT: 65 IN | BODY MASS INDEX: 48.82 KG/M2 | DIASTOLIC BLOOD PRESSURE: 78 MMHG | SYSTOLIC BLOOD PRESSURE: 122 MMHG | WEIGHT: 293 LBS

## 2021-06-07 DIAGNOSIS — Z01.419 ENCNTR FOR GYN EXAM (GENERAL) (ROUTINE) W/O ABN FINDINGS: Primary | ICD-10-CM

## 2021-06-07 DIAGNOSIS — Z12.31 ENCOUNTER FOR SCREENING MAMMOGRAM FOR MALIGNANT NEOPLASM OF BREAST: ICD-10-CM

## 2021-06-07 DIAGNOSIS — Z12.11 COLON CANCER SCREENING: ICD-10-CM

## 2021-06-07 PROCEDURE — S0612 ANNUAL GYNECOLOGICAL EXAMINA: HCPCS | Performed by: PHYSICIAN ASSISTANT

## 2021-06-07 NOTE — PROGRESS NOTES
Karen Rubio Chinmay   1965    CC:  Yearly exam    S:  54 y o  female here for yearly exam  She is still getting bleeds; her last bleed was in April  Prior to this she was getting regular, monthly bleeds that were nearly identical in flow and duration  She is aware to call if she has any bleeding less than 3 weeks apart, heavier bleeding, or prolonged bleeding  She denies vaginal discharge, itching, odor or dryness  Sexual activity: She is sexually active without pain, bleeding or dryness  She uses nothing for contraception  Last Pap: 5/18/20 neg/neg  Last Mammo: 4/2/21 neg  Last Colonoscopy: 2016 (due now)      We reviewed [facility] guidelines for Pap testing       Family hx of breast cancer: mother (59), maternal aunt (74)  Family hx of ovarian cancer: no  Family hx of colon cancer: sister (46)      Current Outpatient Medications:     calcium polycarbophil (FIBERCON) 625 mg tablet, Take 625 mg by mouth daily, Disp: , Rfl:     cholecalciferol (VITAMIN D3) 1,000 units tablet, Take by mouth, Disp: , Rfl:     Coenzyme Q10 (CO Q 10) 100 MG CAPS, Take by mouth, Disp: , Rfl:     levothyroxine 100 mcg tablet, TAKE ONE TABLET BY MOUTH EVERY DAY, Disp: 90 tablet, Rfl: 0    lovastatin (MEVACOR) 10 MG tablet, TAKE ONE TABLET BY MOUTH EVERY DAY AT BEDTIME, Disp: 90 tablet, Rfl: 0    Magnesium 100 MG CAPS, Take by mouth, Disp: , Rfl:     MULTIPLE VITAMIN-FOLIC ACID PO, Take by mouth, Disp: , Rfl:     Omega-3 Fatty Acids (FISH OIL) 1,000 mg, Take 1,000 mg by mouth daily, Disp: , Rfl:     potassium chloride (MICRO-K) 10 MEQ CR capsule, Take 1 capsule (10 mEq total) by mouth 2 (two) times a day, Disp: 60 capsule, Rfl: 0  Social History     Socioeconomic History    Marital status: /Civil Union     Spouse name: Not on file    Number of children: Not on file    Years of education: Not on file    Highest education level: Not on file   Occupational History    Not on file   Social Needs    Financial resource strain: Not on file    Food insecurity     Worry: Not on file     Inability: Not on file    Transportation needs     Medical: Not on file     Non-medical: Not on file   Tobacco Use    Smoking status: Never Smoker    Smokeless tobacco: Never Used   Substance and Sexual Activity    Alcohol use: Yes     Comment: SOCIAL    Drug use: No    Sexual activity: Yes     Partners: Male   Lifestyle    Physical activity     Days per week: Not on file     Minutes per session: Not on file    Stress: Not on file   Relationships    Social connections     Talks on phone: Not on file     Gets together: Not on file     Attends Congregational service: Not on file     Active member of club or organization: Not on file     Attends meetings of clubs or organizations: Not on file     Relationship status: Not on file    Intimate partner violence     Fear of current or ex partner: Not on file     Emotionally abused: Not on file     Physically abused: Not on file     Forced sexual activity: Not on file   Other Topics Concern    Not on file   Social History Narrative    DAILY COFFEE CONSUMPTION (1 CUPS/DAY)    USES SAFETY EQUIPMENT - [employer]     Family History   Problem Relation Age of Onset    Breast cancer Mother 62    Hypertension Father     Colon cancer Sister 50    Heart disease Sister     Hypertension Sister     Diabetes Maternal Grandmother     Arthritis Family     Leukemia Family     Lung cancer Family     Other Family         SKIN DISORDER    Thyroid disease Family     Uterine cancer Family     Leukemia Sister 37    Cervical cancer Sister 28    No Known Problems Maternal Grandfather     No Known Problems Paternal Grandmother     No Known Problems Paternal Grandfather     Breast cancer Maternal Aunt 66     Past Medical History:   Diagnosis Date    Arthritis     Hypercholesterolemia     Hypothyroidism         Review of Systems   Respiratory: Negative  Cardiovascular: Negative      Gastrointestinal: Negative for constipation and diarrhea  Genitourinary: Negative for difficulty urinating, pelvic pain, vaginal bleeding, vaginal discharge, itching or odor  O:  Blood pressure 122/78, height 5' 4 96" (1 65 m), weight (!) 144 kg (316 lb 9 6 oz), last menstrual period 04/27/2021  Patient appears well and is not in distress  Neck is supple without masses  Breasts are symmetrical without mass, tenderness, nipple discharge, skin changes or adenopathy  Abdomen is soft and nontender without masses  External genitals are normal without lesions or rashes  Urethral meatus and urethra are normal  Bladder is normal to palpation  Vagina is normal without discharge or bleeding  Cervix is normal without discharge or lesion  Uterus is normal, mobile, nontender without palpable mass  Adnexa are normal, nontender, without palpable mass  A:  Yearly exam      P:   Pap 2025   Mammo slip given   Colonoscopy due now - referral placed      RTO one year for yearly exam or sooner as needed

## 2021-06-18 DIAGNOSIS — E03.9 HYPOTHYROIDISM, UNSPECIFIED TYPE: ICD-10-CM

## 2021-06-18 DIAGNOSIS — E78.2 MIXED HYPERLIPIDEMIA: ICD-10-CM

## 2021-06-20 RX ORDER — LEVOTHYROXINE SODIUM 0.1 MG/1
TABLET ORAL
Qty: 90 TABLET | Refills: 0 | Status: SHIPPED | OUTPATIENT
Start: 2021-06-20 | End: 2021-10-26

## 2021-06-20 RX ORDER — LOVASTATIN 10 MG/1
TABLET ORAL
Qty: 90 TABLET | Refills: 0 | Status: SHIPPED | OUTPATIENT
Start: 2021-06-20 | End: 2021-10-26

## 2021-07-28 ENCOUNTER — OFFICE VISIT (OUTPATIENT)
Dept: FAMILY MEDICINE CLINIC | Facility: CLINIC | Age: 56
End: 2021-07-28
Payer: COMMERCIAL

## 2021-07-28 VITALS
OXYGEN SATURATION: 98 % | DIASTOLIC BLOOD PRESSURE: 84 MMHG | BODY MASS INDEX: 48.82 KG/M2 | HEART RATE: 88 BPM | TEMPERATURE: 97.6 F | HEIGHT: 65 IN | WEIGHT: 293 LBS | SYSTOLIC BLOOD PRESSURE: 156 MMHG

## 2021-07-28 DIAGNOSIS — E66.01 MORBID OBESITY (HCC): ICD-10-CM

## 2021-07-28 DIAGNOSIS — L23.7 POISON IVY DERMATITIS: ICD-10-CM

## 2021-07-28 DIAGNOSIS — E78.2 MIXED HYPERLIPIDEMIA: ICD-10-CM

## 2021-07-28 DIAGNOSIS — E03.9 HYPOTHYROIDISM, UNSPECIFIED TYPE: Primary | ICD-10-CM

## 2021-07-28 PROCEDURE — 3725F SCREEN DEPRESSION PERFORMED: CPT | Performed by: PHYSICIAN ASSISTANT

## 2021-07-28 PROCEDURE — 99214 OFFICE O/P EST MOD 30 MIN: CPT | Performed by: PHYSICIAN ASSISTANT

## 2021-07-28 PROCEDURE — 1036F TOBACCO NON-USER: CPT | Performed by: PHYSICIAN ASSISTANT

## 2021-07-28 PROCEDURE — 3008F BODY MASS INDEX DOCD: CPT | Performed by: PHYSICIAN ASSISTANT

## 2021-07-28 RX ORDER — PREDNISONE 10 MG/1
10 TABLET ORAL 2 TIMES DAILY WITH MEALS
Qty: 10 TABLET | Refills: 0 | Status: SHIPPED | OUTPATIENT
Start: 2021-07-28 | End: 2022-03-29

## 2021-07-28 NOTE — PROGRESS NOTES
BMI Counseling: Body mass index is 51 89 kg/m²  The BMI is above normal  Nutrition recommendations include decreasing portion sizes, limiting drinks that contain sugar and moderation in carbohydrate intake  Exercise recommendations include exercising 3-5 times per week  Assessment/Plan:     Diagnoses and all orders for this visit:    Hypothyroidism, unspecified type  Comments:  Continue levothyroxine 100 mcg  Proceed with labs  Orders:  -     TSH, 3rd generation  -     T4  -     T3    Mixed hyperlipidemia  Comments:  Continue statin therapy and low-fat diet  Orders:  -     Comprehensive metabolic panel  -     Lipid panel    Morbid obesity (HCC)  Comments:  Exercise encouraged    Poison ivy dermatitis  Comments:  P o  Prednisone ordered  Keep area clean and dry  May apply topical such as calamine  Orders:  -     predniSONE 10 mg tablet; Take 1 tablet (10 mg total) by mouth 2 (two) times a day with meals          Subjective:      Patient ID: Paresh Terry is a 54 y o  female  Presents in the office for follow up chronic conditions  Patient has hypothyroidism she is on levothyroxine 100 mcg  She is due for routine lab work  She is also on lovastatin 10 mg for hyperlipidemia  Patient also has poison ivy  She has had a few days it is spreading  His saccular spots on her right forearm  She has now developed some vesicular spots on her right temporal region        The following portions of the patient's history were reviewed and updated as appropriate:   She   Patient Active Problem List    Diagnosis Date Noted    Lymphedema of both lower extremities 06/11/2020    Bilateral hip pain 04/19/2019    Muscle cramps 04/19/2019    Chronic pain of multiple joints 09/21/2018    Morbid obesity ([facility] [address] ) 05/19/2016    Multiple joint pain 05/19/2016    Hyperlipidemia 05/07/2013    Hypothyroidism 05/07/2013    Vitamin D deficiency 05/07/2013     Current Outpatient Medications   Medication Sig Dispense Refill  calcium polycarbophil (FIBERCON) 625 mg tablet Take 625 mg by mouth daily      cholecalciferol (VITAMIN D3) 1,000 units tablet Take by mouth      Coenzyme Q10 (CO Q 10) 100 MG CAPS Take by mouth      levothyroxine 100 mcg tablet TAKE ONE TABLET BY MOUTH EVERY DAY 90 tablet 0    lovastatin (MEVACOR) 10 MG tablet TAKE ONE TABLET BY MOUTH EVERY DAY AT BEDTIME 90 tablet 0    Magnesium 100 MG CAPS Take by mouth      MULTIPLE VITAMIN-FOLIC ACID PO Take by mouth      Omega-3 Fatty Acids (FISH OIL) 1,000 mg Take 1,000 mg by mouth daily      predniSONE 10 mg tablet Take 1 tablet (10 mg total) by mouth 2 (two) times a day with meals 10 tablet 0     No current facility-administered medications for this visit  She is allergic to penicillins       Review of Systems   Constitutional: Negative for activity change and unexpected weight change  HENT: Negative for ear pain and sore throat  Eyes: Negative for visual disturbance  Respiratory: Negative for cough, shortness of breath and wheezing  Cardiovascular: Negative for chest pain and leg swelling  Gastrointestinal: Negative for abdominal pain, blood in stool, constipation, diarrhea, nausea and vomiting  Genitourinary: Negative for difficulty urinating  Musculoskeletal: Negative for arthralgias and myalgias  Skin: Negative for rash  Neurological: Negative for dizziness, syncope, light-headedness and headaches  Psychiatric/Behavioral: Negative for self-injury, sleep disturbance and suicidal ideas  The patient is not nervous/anxious  Objective:        Physical Exam  Vitals and nursing note reviewed  Constitutional:       General: She is not in acute distress  Appearance: She is well-developed  She is obese  She is not diaphoretic  HENT:      Head: Normocephalic and atraumatic        Right Ear: Tympanic membrane, ear canal and external ear normal       Left Ear: Tympanic membrane, ear canal and external ear normal  Mouth/Throat:      Pharynx: No posterior oropharyngeal erythema  Eyes:      Conjunctiva/sclera: Conjunctivae normal       Pupils: Pupils are equal, round, and reactive to light  Neck:      Thyroid: No thyromegaly  Vascular: No carotid bruit  Cardiovascular:      Rate and Rhythm: Normal rate and regular rhythm  Heart sounds: Normal heart sounds  No murmur heard  No friction rub  No gallop  Pulmonary:      Effort: Pulmonary effort is normal  No respiratory distress  Breath sounds: Normal breath sounds  No wheezing  Abdominal:      General: Bowel sounds are normal  There is no distension  Palpations: Abdomen is soft  There is no mass  Tenderness: There is no abdominal tenderness  Musculoskeletal:      Right lower leg: No edema  Left lower leg: No edema  Lymphadenopathy:      Cervical: No cervical adenopathy  Skin:     General: Skin is warm and dry  Findings: No erythema or rash  Neurological:      General: No focal deficit present  Mental Status: She is alert and oriented to person, place, and time  Psychiatric:         Mood and Affect: Mood normal          Behavior: Behavior normal          Thought Content:  Thought content normal          Judgment: Judgment normal

## 2021-09-22 ENCOUNTER — TELEPHONE (OUTPATIENT)
Dept: FAMILY MEDICINE CLINIC | Facility: CLINIC | Age: 56
End: 2021-09-22

## 2021-09-22 NOTE — TELEPHONE ENCOUNTER
Patient called in- had consult with Dr Jessie Yoo yesterday  She was told that she's not a candidate for a cologuard but she really doesn't want to do a colonoscopy at this time with all the "hurtles" COVID is making them jump though  She said she doesn't remember if she had polyps removed in the past  But it's a cologuard or nothing  Please advise   br

## 2021-10-26 DIAGNOSIS — E78.2 MIXED HYPERLIPIDEMIA: ICD-10-CM

## 2021-10-26 DIAGNOSIS — E03.9 HYPOTHYROIDISM, UNSPECIFIED TYPE: ICD-10-CM

## 2021-10-26 RX ORDER — LEVOTHYROXINE SODIUM 0.1 MG/1
TABLET ORAL
Qty: 90 TABLET | Refills: 0 | Status: SHIPPED | OUTPATIENT
Start: 2021-10-26 | End: 2022-03-29 | Stop reason: SDUPTHER

## 2021-10-26 RX ORDER — LOVASTATIN 10 MG/1
TABLET ORAL
Qty: 90 TABLET | Refills: 0 | Status: SHIPPED | OUTPATIENT
Start: 2021-10-26 | End: 2022-03-29 | Stop reason: SDUPTHER

## 2022-03-28 ENCOUNTER — APPOINTMENT (OUTPATIENT)
Dept: LAB | Facility: MEDICAL CENTER | Age: 57
End: 2022-03-28
Payer: COMMERCIAL

## 2022-03-28 LAB
ALBUMIN SERPL BCP-MCNC: 3.9 G/DL (ref 3.5–5)
ALP SERPL-CCNC: 75 U/L (ref 46–116)
ALT SERPL W P-5'-P-CCNC: 37 U/L (ref 12–78)
ANION GAP SERPL CALCULATED.3IONS-SCNC: 5 MMOL/L (ref 4–13)
AST SERPL W P-5'-P-CCNC: 25 U/L (ref 5–45)
BILIRUB SERPL-MCNC: 0.64 MG/DL (ref 0.2–1)
BUN SERPL-MCNC: 10 MG/DL (ref 5–25)
CALCIUM SERPL-MCNC: 9.5 MG/DL (ref 8.3–10.1)
CHLORIDE SERPL-SCNC: 104 MMOL/L (ref 100–108)
CHOLEST SERPL-MCNC: 216 MG/DL
CO2 SERPL-SCNC: 28 MMOL/L (ref 21–32)
CREAT SERPL-MCNC: 0.74 MG/DL (ref 0.6–1.3)
GFR SERPL CREATININE-BSD FRML MDRD: 90 ML/MIN/1.73SQ M
GLUCOSE P FAST SERPL-MCNC: 115 MG/DL (ref 65–99)
HDLC SERPL-MCNC: 40 MG/DL
LDLC SERPL CALC-MCNC: 145 MG/DL (ref 0–100)
NONHDLC SERPL-MCNC: 176 MG/DL
POTASSIUM SERPL-SCNC: 4.2 MMOL/L (ref 3.5–5.3)
PROT SERPL-MCNC: 8.5 G/DL (ref 6.4–8.2)
SODIUM SERPL-SCNC: 137 MMOL/L (ref 136–145)
T3 SERPL-MCNC: 1.1 NG/ML (ref 0.6–1.8)
T4 SERPL-MCNC: 10.6 UG/DL (ref 4.7–13.3)
TRIGL SERPL-MCNC: 155 MG/DL
TSH SERPL DL<=0.05 MIU/L-ACNC: 2.43 UIU/ML (ref 0.36–3.74)

## 2022-03-28 PROCEDURE — 84436 ASSAY OF TOTAL THYROXINE: CPT | Performed by: PHYSICIAN ASSISTANT

## 2022-03-28 PROCEDURE — 80053 COMPREHEN METABOLIC PANEL: CPT | Performed by: PHYSICIAN ASSISTANT

## 2022-03-28 PROCEDURE — 36415 COLL VENOUS BLD VENIPUNCTURE: CPT | Performed by: PHYSICIAN ASSISTANT

## 2022-03-28 PROCEDURE — 80061 LIPID PANEL: CPT | Performed by: PHYSICIAN ASSISTANT

## 2022-03-28 PROCEDURE — 84443 ASSAY THYROID STIM HORMONE: CPT | Performed by: PHYSICIAN ASSISTANT

## 2022-03-28 PROCEDURE — 84480 ASSAY TRIIODOTHYRONINE (T3): CPT | Performed by: PHYSICIAN ASSISTANT

## 2022-03-29 ENCOUNTER — OFFICE VISIT (OUTPATIENT)
Dept: FAMILY MEDICINE CLINIC | Facility: CLINIC | Age: 57
End: 2022-03-29
Payer: COMMERCIAL

## 2022-03-29 VITALS
WEIGHT: 293 LBS | DIASTOLIC BLOOD PRESSURE: 90 MMHG | OXYGEN SATURATION: 92 % | BODY MASS INDEX: 48.82 KG/M2 | HEART RATE: 105 BPM | SYSTOLIC BLOOD PRESSURE: 160 MMHG | TEMPERATURE: 97.4 F | HEIGHT: 65 IN

## 2022-03-29 DIAGNOSIS — R10.13 DYSPEPSIA: ICD-10-CM

## 2022-03-29 DIAGNOSIS — E78.2 MIXED HYPERLIPIDEMIA: ICD-10-CM

## 2022-03-29 DIAGNOSIS — E03.9 HYPOTHYROIDISM, UNSPECIFIED TYPE: Primary | ICD-10-CM

## 2022-03-29 DIAGNOSIS — E03.9 HYPOTHYROIDISM, UNSPECIFIED TYPE: ICD-10-CM

## 2022-03-29 DIAGNOSIS — E66.01 MORBID OBESITY (HCC): ICD-10-CM

## 2022-03-29 PROCEDURE — 3725F SCREEN DEPRESSION PERFORMED: CPT | Performed by: PHYSICIAN ASSISTANT

## 2022-03-29 PROCEDURE — 99214 OFFICE O/P EST MOD 30 MIN: CPT | Performed by: PHYSICIAN ASSISTANT

## 2022-03-29 PROCEDURE — 1036F TOBACCO NON-USER: CPT | Performed by: PHYSICIAN ASSISTANT

## 2022-03-29 RX ORDER — LEVOTHYROXINE SODIUM 0.1 MG/1
100 TABLET ORAL DAILY
Qty: 90 TABLET | Refills: 1 | Status: SHIPPED | OUTPATIENT
Start: 2022-03-29 | End: 2022-08-10 | Stop reason: SDUPTHER

## 2022-03-29 RX ORDER — LOVASTATIN 20 MG/1
20 TABLET ORAL
Qty: 90 TABLET | Refills: 1 | Status: SHIPPED | OUTPATIENT
Start: 2022-03-29 | End: 2022-08-10 | Stop reason: SDUPTHER

## 2022-03-29 NOTE — PROGRESS NOTES
Assessment/Plan:     Diagnoses and all orders for this visit:    Hypothyroidism, unspecified type  Comments:  TSH in normal range continue levothyroxine 100 mcg  Orders:  -     levothyroxine 100 mcg tablet; Take 1 tablet (100 mcg total) by mouth daily    Mixed hyperlipidemia  Comments:  Lipids not at goal increase lovastatin to 20 mg  Orders:  -     lovastatin (MEVACOR) 20 mg tablet; Take 1 tablet (20 mg total) by mouth daily at bedtime    Morbid obesity (HCC)    Dyspepsia  Comments:  Trial of Levsin  Patient will call in a day or 2  May need Gastroenterology  Orders:  -     hyoscyamine (LEVSIN/SL) 0 125 mg SL tablet; Take 1 tablet (0 125 mg total) by mouth every 4 (four) hours as needed (belching)    Hypothyroidism, unspecified type  -     levothyroxine 100 mcg tablet; Take 1 tablet (100 mcg total) by mouth daily          Subjective:      Patient ID: Vanessa Gonzalez is a 64 y o  female  Presents in the office for follow up chronic conditions  Patient has hypothyroidism she is on levothyroxine 100 mcg  Her thyroid panel is in normal range  She also has hyperlipidemia she is on lovastatin 10 mg  Total cholesterol 216 with LDL at 145  Suggest increasing lovastatin to 20 mg  Patient states she has had dyspepsia or constant burping for about a week  Patient states back in January she had an upper respiratory with a lot of coughing postnasal drip etc   For the dyspepsia she tried Gas-X with no relief  Patient feels like she has trouble swallowing liquid and solids  Decreased appetite some nausea but no vomiting    No change in bowel movements last BM was today        The following portions of the patient's history were reviewed and updated as appropriate:   She   Patient Active Problem List    Diagnosis Date Noted    Dyspepsia 03/29/2022    Lymphedema of both lower extremities 06/11/2020    Bilateral hip pain 04/19/2019    Muscle cramps 04/19/2019    Chronic pain of multiple joints 09/21/2018    Morbid obesity ([facility] [address] ) 05/19/2016    Multiple joint pain 05/19/2016    Hyperlipidemia 05/07/2013    Hypothyroidism 05/07/2013    Vitamin D deficiency 05/07/2013     Current Outpatient Medications   Medication Sig Dispense Refill    calcium polycarbophil (FIBERCON) 625 mg tablet Take 625 mg by mouth daily As needed       cholecalciferol (VITAMIN D3) 1,000 units tablet Take by mouth      Coenzyme Q10 (CO Q 10) 100 MG CAPS Take by mouth      hyoscyamine (LEVSIN/SL) 0 125 mg SL tablet Take 1 tablet (0 125 mg total) by mouth every 4 (four) hours as needed (belching) 30 tablet 0    levothyroxine 100 mcg tablet Take 1 tablet (100 mcg total) by mouth daily 90 tablet 1    lovastatin (MEVACOR) 20 mg tablet Take 1 tablet (20 mg total) by mouth daily at bedtime 90 tablet 1    Magnesium 100 MG CAPS Take by mouth      MULTIPLE VITAMIN-FOLIC ACID PO Take by mouth       No current facility-administered medications for this visit  She is allergic to penicillins       Review of Systems   Constitutional: Negative for activity change and unexpected weight change  HENT: Positive for trouble swallowing  Negative for ear pain and sore throat  Eyes: Negative for visual disturbance  Respiratory: Negative for cough, shortness of breath and wheezing  Cardiovascular: Negative for chest pain and leg swelling  Gastrointestinal: Positive for nausea  Negative for abdominal pain, blood in stool, constipation, diarrhea and vomiting  Burping   Bloating     Genitourinary: Negative for difficulty urinating  Musculoskeletal: Negative for arthralgias and myalgias  Skin: Negative for rash  Neurological: Negative for dizziness, syncope, light-headedness and headaches  Psychiatric/Behavioral: Negative for self-injury, sleep disturbance and suicidal ideas  The patient is not nervous/anxious  Objective:        Physical Exam  Vitals and nursing note reviewed     Constitutional:       General: She is not in acute distress  Appearance: She is well-developed  She is obese  She is not diaphoretic  HENT:      Head: Normocephalic and atraumatic  Right Ear: Tympanic membrane, ear canal and external ear normal       Left Ear: Tympanic membrane, ear canal and external ear normal       Mouth/Throat:      Pharynx: No posterior oropharyngeal erythema  Eyes:      Conjunctiva/sclera: Conjunctivae normal       Pupils: Pupils are equal, round, and reactive to light  Neck:      Thyroid: No thyromegaly  Vascular: No carotid bruit  Cardiovascular:      Rate and Rhythm: Normal rate and regular rhythm  Heart sounds: Normal heart sounds  No murmur heard  No friction rub  No gallop  Pulmonary:      Effort: Pulmonary effort is normal  No respiratory distress  Breath sounds: Normal breath sounds  No wheezing  Abdominal:      General: Abdomen is protuberant  Bowel sounds are normal  There is no distension  Palpations: Abdomen is soft  There is no mass  Tenderness: There is no abdominal tenderness  Hernia: A hernia is present  Hernia is present in the umbilical area  Musculoskeletal:      Right lower leg: Edema present  Left lower leg: Edema present  Lymphadenopathy:      Cervical: No cervical adenopathy  Skin:     General: Skin is warm and dry  Findings: No erythema or rash  Neurological:      General: No focal deficit present  Mental Status: She is alert and oriented to person, place, and time  Psychiatric:         Mood and Affect: Mood normal          Behavior: Behavior normal          Thought Content:  Thought content normal          Judgment: Judgment normal

## 2022-03-30 ENCOUNTER — TELEPHONE (OUTPATIENT)
Dept: FAMILY MEDICINE CLINIC | Facility: CLINIC | Age: 57
End: 2022-03-30

## 2022-03-30 ENCOUNTER — HOSPITAL ENCOUNTER (EMERGENCY)
Facility: HOSPITAL | Age: 57
Discharge: HOME | End: 2022-03-31
Attending: EMERGENCY MEDICINE | Admitting: EMERGENCY MEDICINE
Payer: COMMERCIAL

## 2022-03-30 VITALS
RESPIRATION RATE: 18 BRPM | SYSTOLIC BLOOD PRESSURE: 197 MMHG | HEIGHT: 65 IN | WEIGHT: 293 LBS | OXYGEN SATURATION: 100 % | DIASTOLIC BLOOD PRESSURE: 119 MMHG | TEMPERATURE: 98 F | BODY MASS INDEX: 48.82 KG/M2 | HEART RATE: 95 BPM

## 2022-03-30 DIAGNOSIS — R10.9 ABDOMINAL PAIN: Primary | ICD-10-CM

## 2022-03-30 LAB
ALBUMIN SERPL BCP-MCNC: 4.4 G/DL (ref 3.5–5)
ALP SERPL-CCNC: 81 U/L (ref 46–116)
ALT SERPL W P-5'-P-CCNC: 110 U/L (ref 12–78)
ANION GAP SERPL CALCULATED.3IONS-SCNC: 11 MMOL/L (ref 4–13)
AST SERPL W P-5'-P-CCNC: 62 U/L (ref 5–45)
BASOPHILS # BLD AUTO: 0.1 THOUSANDS/ΜL (ref 0–0.1)
BASOPHILS NFR BLD AUTO: 1 % (ref 0–1)
BILIRUB SERPL-MCNC: 0.9 MG/DL (ref 0.2–1)
BUN SERPL-MCNC: 10 MG/DL (ref 5–25)
CALCIUM SERPL-MCNC: 9.5 MG/DL (ref 8.3–10.1)
CHLORIDE SERPL-SCNC: 101 MMOL/L (ref 100–108)
CO2 SERPL-SCNC: 27 MMOL/L (ref 21–32)
CREAT SERPL-MCNC: 0.9 MG/DL (ref 0.6–1.3)
EOSINOPHIL # BLD AUTO: 0.09 THOUSAND/ΜL (ref 0–0.61)
EOSINOPHIL NFR BLD AUTO: 1 % (ref 0–6)
ERYTHROCYTE [DISTWIDTH] IN BLOOD BY AUTOMATED COUNT: 13.4 % (ref 11.6–15.1)
GFR SERPL CREATININE-BSD FRML MDRD: 71 ML/MIN/1.73SQ M
GLUCOSE SERPL-MCNC: 111 MG/DL (ref 65–140)
HCT VFR BLD AUTO: 47.6 % (ref 34.8–46.1)
HGB BLD-MCNC: 15.6 G/DL (ref 11.5–15.4)
IMM GRANULOCYTES # BLD AUTO: 0.02 THOUSAND/UL (ref 0–0.2)
IMM GRANULOCYTES NFR BLD AUTO: 0 % (ref 0–2)
LIPASE SERPL-CCNC: 74 U/L (ref 73–393)
LYMPHOCYTES # BLD AUTO: 3.11 THOUSANDS/ΜL (ref 0.6–4.47)
LYMPHOCYTES NFR BLD AUTO: 28 % (ref 14–44)
MCH RBC QN AUTO: 31.1 PG (ref 26.8–34.3)
MCHC RBC AUTO-ENTMCNC: 32.8 G/DL (ref 31.4–37.4)
MCV RBC AUTO: 95 FL (ref 82–98)
MONOCYTES # BLD AUTO: 1.21 THOUSAND/ΜL (ref 0.17–1.22)
MONOCYTES NFR BLD AUTO: 11 % (ref 4–12)
NEUTROPHILS # BLD AUTO: 6.5 THOUSANDS/ΜL (ref 1.85–7.62)
NEUTS SEG NFR BLD AUTO: 59 % (ref 43–75)
NRBC BLD AUTO-RTO: 0 /100 WBCS
PLATELET # BLD AUTO: 238 THOUSANDS/UL (ref 149–390)
PMV BLD AUTO: 9.3 FL (ref 8.9–12.7)
POTASSIUM SERPL-SCNC: 3.8 MMOL/L (ref 3.5–5.3)
PROT SERPL-MCNC: 9.2 G/DL (ref 6.4–8.2)
RBC # BLD AUTO: 5.02 MILLION/UL (ref 3.81–5.12)
SODIUM SERPL-SCNC: 139 MMOL/L (ref 136–145)
WBC # BLD AUTO: 11.03 THOUSAND/UL (ref 4.31–10.16)

## 2022-03-30 PROCEDURE — 80053 COMPREHEN METABOLIC PANEL: CPT | Performed by: EMERGENCY MEDICINE

## 2022-03-30 PROCEDURE — 83690 ASSAY OF LIPASE: CPT | Performed by: EMERGENCY MEDICINE

## 2022-03-30 PROCEDURE — 36415 COLL VENOUS BLD VENIPUNCTURE: CPT | Performed by: EMERGENCY MEDICINE

## 2022-03-30 PROCEDURE — 99284 EMERGENCY DEPT VISIT MOD MDM: CPT

## 2022-03-30 PROCEDURE — 85025 COMPLETE CBC W/AUTO DIFF WBC: CPT | Performed by: EMERGENCY MEDICINE

## 2022-03-30 RX ORDER — FAMOTIDINE 20 MG/1
40 TABLET, FILM COATED ORAL ONCE
Status: COMPLETED | OUTPATIENT
Start: 2022-03-30 | End: 2022-03-30

## 2022-03-30 RX ORDER — ONDANSETRON 4 MG/1
4 TABLET, ORALLY DISINTEGRATING ORAL ONCE
Status: COMPLETED | OUTPATIENT
Start: 2022-03-30 | End: 2022-03-30

## 2022-03-30 RX ADMIN — FAMOTIDINE 40 MG: 20 TABLET ORAL at 22:27

## 2022-03-30 RX ADMIN — ONDANSETRON 4 MG: 4 TABLET, ORALLY DISINTEGRATING ORAL at 22:27

## 2022-03-30 NOTE — TELEPHONE ENCOUNTER
Patient called in regards to the medication Hyoscyamine that was prescribed to her yesterday  She said that it is giving her extreme dry mouth and she no longer wants to take it  Patient asked if she could take Pepcid OTC instead    Please advise

## 2022-03-31 ENCOUNTER — CONSULT (OUTPATIENT)
Dept: GASTROENTEROLOGY | Facility: CLINIC | Age: 57
End: 2022-03-31
Payer: COMMERCIAL

## 2022-03-31 VITALS
SYSTOLIC BLOOD PRESSURE: 122 MMHG | WEIGHT: 293 LBS | TEMPERATURE: 98 F | HEIGHT: 65 IN | OXYGEN SATURATION: 95 % | HEART RATE: 101 BPM | DIASTOLIC BLOOD PRESSURE: 87 MMHG | BODY MASS INDEX: 48.82 KG/M2

## 2022-03-31 DIAGNOSIS — R14.2 BELCHING: ICD-10-CM

## 2022-03-31 DIAGNOSIS — R79.89 ELEVATED LFTS: ICD-10-CM

## 2022-03-31 DIAGNOSIS — R63.0 ANOREXIA: ICD-10-CM

## 2022-03-31 DIAGNOSIS — R13.19 ESOPHAGEAL DYSPHAGIA: Primary | ICD-10-CM

## 2022-03-31 DIAGNOSIS — Z12.11 COLON CANCER SCREENING: ICD-10-CM

## 2022-03-31 PROCEDURE — 99283 EMERGENCY DEPT VISIT LOW MDM: CPT | Performed by: EMERGENCY MEDICINE

## 2022-03-31 PROCEDURE — 3008F BODY MASS INDEX DOCD: CPT | Performed by: PHYSICIAN ASSISTANT

## 2022-03-31 PROCEDURE — 99203 OFFICE O/P NEW LOW 30 MIN: CPT | Performed by: PHYSICIAN ASSISTANT

## 2022-03-31 RX ORDER — FAMOTIDINE 20 MG/1
20 TABLET, FILM COATED ORAL 2 TIMES DAILY
Qty: 30 TABLET | Refills: 0 | Status: SHIPPED | OUTPATIENT
Start: 2022-03-31

## 2022-03-31 RX ORDER — SUCRALFATE ORAL 1 G/10ML
1 SUSPENSION ORAL
Qty: 420 ML | Refills: 1 | Status: SHIPPED | OUTPATIENT
Start: 2022-03-31 | End: 2022-03-31

## 2022-03-31 RX ORDER — SUCRALFATE 1 G/1
1 TABLET ORAL
Qty: 90 TABLET | Refills: 2 | Status: SHIPPED | OUTPATIENT
Start: 2022-03-31

## 2022-03-31 RX ORDER — ONDANSETRON 4 MG/1
4 TABLET, ORALLY DISINTEGRATING ORAL EVERY 6 HOURS PRN
Qty: 20 TABLET | Refills: 0 | Status: SHIPPED | OUTPATIENT
Start: 2022-03-31

## 2022-03-31 NOTE — PATIENT INSTRUCTIONS
Scheduled date of EGD(as of today): 04/07/22  Physician performing EGD: Flores  Location of EGD: [facility]  Instructions reviewed with patient by: [place]  Clearances: none

## 2022-03-31 NOTE — PROGRESS NOTES
[facility] - Outpatient Consultation  Aung Moy 64 y o  female MRN: [id number]  Encounter: [id number]          ASSESSMENT AND PLAN:      #1  Esophageal dysphagia with belching and anorexia: acute onset about 1 week ago with poor appetite, difficulty swallowing, poor PO intake, significant belching, had URI prior, denies NSAID use or alcohol use  No recent abx use  Having trouble taking pills  -start pepcid BID crushed as this helped in ED yesterday  -add carafate before oral intake  -advised to return to ED if food was obstructed with inability to tolerate oral secretions or if unable to stay hydrated  -will get in for urgent EGD to rule out severe esophagitis, infectious cause, esophageal stricture  -anti-reflux diet and measures     #2  History of colon polyps with family history of colon cancer: last colonoscopy was in 2016 with polyps removed, was to have repeat in 3 years but has not had one since  No symptoms  Sister with colon cancer in late 42's  -patient needs colonoscopy however she does not feel she can tolerated prep at this time  Will address swallowing issues first and have patient return to have colonoscopy in the near future at a later time    #3  Elevated LFTs: noted to be elevated in ED yesterday but were normal 2 days prior  -repeat LFTs in a few weeks, if still elevated, will complete further work up   ______________________________________________________________________    HPI:  This is a 14-year-old female with a history of hypothyroidism, hyperlipidemia, obesity, and history of colon polyps who presented to the office for new patient visit for trouble swallowing  Patient reports that approximately 1 week ago she began to have a sudden onset of severe belching with issues with swallowing  She reports prior to this she had an upper respiratory infection for several weeks with a lot of phlegm and mucus    This resolved in the belching started shortly after   She reports a very decreased appetite with abdominal bloating and issues with swallowing both solids and liquids  She feels that things get stuck on the way down and sit in her esophagus  She denies any odynophagia  Denies any nausea or vomiting  Reports decreased bowel movements due to poor oral intake but prior to this was having normal bowels  Denies any blood in the stool or black tarry stools  She was seen in the emergency room last night due to severe belching and feeling that food was stuck in her esophagus  She denies any issues with tolerating her secretions  She reports yesterday she only had a small amount of tapioca pudding for breakfast and then a small amount of applesauce for lunch  She reports that she was given Pepcid in the emergency room which they crushed and put in applesauce  She has not picked this prescription up from the pharmacy but reports that did help her a bit in the emergency room  She reports she has difficulty swallowing pills  She was also noted to have elevated LFTs in the emergency room yesterday but these had been normal 2 days prior on labs  Denies any excessive NSAID use recently  Denies any alcohol use  Denies any recent antibiotic use  Reports that she has a family history of colon cancer in her sister who was diagnosed in her 45s  Her last colonoscopy was done in 2016 with a few polyps removed was recommended to come back in 3 years but has not had a repeat since that time  Denies any history of having an endoscopy in the past         REVIEW OF SYSTEMS:    CONSTITUTIONAL: Denies any fever, chills, rigors, and weight loss  HEENT: No earache or tinnitus  Denies hearing loss or visual disturbances  CARDIOVASCULAR: No chest pain or palpitations  RESPIRATORY: Denies any cough, hemoptysis, shortness of breath or dyspnea on exertion  GASTROINTESTINAL: As noted in the History of Present Illness  GENITOURINARY: No problems with urination   Denies any hematuria or dysuria  NEUROLOGIC: No dizziness or vertigo, denies headaches  MUSCULOSKELETAL: Denies any muscle or joint pain  SKIN: Denies skin rashes or itching  ENDOCRINE: Denies excessive thirst  Denies intolerance to heat or cold  PSYCHOSOCIAL: Denies depression or anxiety  Denies any recent memory loss         Historical Information   Past Medical History:   Diagnosis Date    Arthritis     Hypercholesterolemia     Hypothyroidism      Past Surgical History:   Procedure Laterality Date    COLONOSCOPY      DILATION AND CURETTAGE OF UTERUS      ENDOMETRIAL BIOPSY      BY SUCTION     Social History   Social History     Substance and Sexual Activity   Alcohol Use Yes    Comment: SOCIAL     Social History     Substance and Sexual Activity   Drug Use No     Social History     Tobacco Use   Smoking Status Never Smoker   Smokeless Tobacco Never Used     Family History   Problem Relation Age of Onset    Breast cancer Mother 62    Hypertension Father     Colon cancer Sister 50    Heart disease Sister     Hypertension Sister     Diabetes Maternal Grandmother     Arthritis Family     Leukemia Family     Lung cancer Family     Other Family         SKIN DISORDER    Thyroid disease Family     Uterine cancer Family     Leukemia Sister 37    Cervical cancer Sister 28    No Known Problems Maternal Grandfather     No Known Problems Paternal Grandmother     No Known Problems Paternal Grandfather     Breast cancer Maternal Aunt 78       Meds/Allergies       Current Outpatient Medications:     calcium polycarbophil (FIBERCON) 625 mg tablet    cholecalciferol (VITAMIN D3) 1,000 units tablet    Coenzyme Q10 (CO Q 10) 100 MG CAPS    famotidine (PEPCID) 20 mg tablet    levothyroxine 100 mcg tablet    lovastatin (MEVACOR) 20 mg tablet    Magnesium 100 MG CAPS    MULTIPLE VITAMIN-FOLIC ACID PO    ondansetron (Zofran ODT) 4 mg disintegrating tablet    sucralfate (CARAFATE) 1 g/10 mL suspension  No current facility-administered medications for this visit  Allergies   Allergen Reactions    Penicillins Hives           Objective     Blood pressure 122/87, pulse 101, temperature 98 °F (36 7 °C), height 5' 5" (1 651 m), weight (!) 137 kg (301 lb), SpO2 95 %  Body mass index is 50 09 kg/m²  PHYSICAL EXAM:      General Appearance:   Alert, cooperative, no distress; morbidly obese    HEENT:   Normocephalic, atraumatic, anicteric      Neck:  Supple, symmetrical, trachea midline   Lungs:   Clear to auscultation bilaterally; no rales, rhonchi or wheezing; respirations unlabored    Heart[de-identified]   Regular rate and rhythm; no murmur, rub, or gallop     Abdomen:   Soft, obese abdomen, non-tender, non-distended; normal bowel sounds; no masses, no organomegaly    Genitalia:   Deferred    Rectal:   Deferred    Extremities:  No cyanosis, clubbing or edema    Pulses:  2+ and symmetric    Skin:  No jaundice, rashes, or lesions    Lymph nodes:  No palpable cervical lymphadenopathy        Lab Results:   Admission on 03/30/2022, Discharged on 03/31/2022   Component Date Value    WBC 03/30/2022 11 03*    RBC 03/30/2022 5 02     Hemoglobin 03/30/2022 15 6*    Hematocrit 03/30/2022 47 6*    MCV 03/30/2022 95     MCH 03/30/2022 31 1     MCHC 03/30/2022 32 8     RDW 03/30/2022 13 4     MPV 03/30/2022 9 3     Platelets 45/56/1597 238     nRBC 03/30/2022 0     Neutrophils Relative 03/30/2022 59     Immat GRANS % 03/30/2022 0     Lymphocytes Relative 03/30/2022 28     Monocytes Relative 03/30/2022 11     Eosinophils Relative 03/30/2022 1     Basophils Relative 03/30/2022 1     Neutrophils Absolute 03/30/2022 6 50     Immature Grans Absolute 03/30/2022 0 02     Lymphocytes Absolute 03/30/2022 3 11     Monocytes Absolute 03/30/2022 1 21     Eosinophils Absolute 03/30/2022 0 09     Basophils Absolute 03/30/2022 0 10     Sodium 03/30/2022 139     Potassium 03/30/2022 3 8     Chloride 03/30/2022 101     CO2 03/30/2022 27     ANION GAP 03/30/2022 11     BUN 03/30/2022 10     Creatinine 03/30/2022 0 90     Glucose 03/30/2022 111     Calcium 03/30/2022 9 5     AST 03/30/2022 62*    ALT 03/30/2022 110*    Alkaline Phosphatase 03/30/2022 81     Total Protein 03/30/2022 9 2*    Albumin 03/30/2022 4 4     Total Bilirubin 03/30/2022 0 90     eGFR 03/30/2022 71     Lipase 03/30/2022 74          Radiology Results:   No results found

## 2022-04-03 NOTE — ED PROVIDER NOTES
History  Chief Complaint   Patient presents with    Medical Problem     Patient co burping/beltching that started on Friday  Patient reports she is unable to eat or drink  Patient reports she is supposed to see GI doctor however states "I just cant take it anymore "      71-year-old female presenting to emergency department for evaluation of abdominal discomfort  She describes about a week of progressive burning epigastric abdominal pain associated with burping and belching and early satiety  She is able to tolerate p o  But does cause her some discomfort  She has an appointment tomorrow with the GI doctor  Discomfort was worse today and so she presented to the emergency department  She denies fevers or chills  She denies chest pain palpitations or shortness of breath  She denies nausea vomiting or diarrhea  Prior to Admission Medications   Prescriptions Last Dose Informant Patient Reported? Taking?    Coenzyme Q10 (CO Q 10) 100 MG CAPS  Self Yes No   Sig: Take by mouth   MULTIPLE VITAMIN-FOLIC ACID PO  Self Yes No   Sig: Take by mouth   Magnesium 100 MG CAPS  Self Yes No   Sig: Take by mouth   calcium polycarbophil (FIBERCON) 625 mg tablet  Self Yes No   Sig: Take 625 mg by mouth daily As needed    cholecalciferol (VITAMIN D3) 1,000 units tablet  Self Yes No   Sig: Take by mouth   levothyroxine 100 mcg tablet  Self No No   Sig: Take 1 tablet (100 mcg total) by mouth daily   lovastatin (MEVACOR) 20 mg tablet  Self No No   Sig: Take 1 tablet (20 mg total) by mouth daily at bedtime      Facility-Administered Medications: None       Past Medical History:   Diagnosis Date    Arthritis     Hypercholesterolemia     Hypothyroidism        Past Surgical History:   Procedure Laterality Date    COLONOSCOPY      DILATION AND CURETTAGE OF UTERUS      ENDOMETRIAL BIOPSY      BY SUCTION       Family History   Problem Relation Age of Onset    Breast cancer Mother 62    Hypertension Father     Colon cancer Sister 50    Heart disease Sister     Hypertension Sister     Diabetes Maternal Grandmother     Arthritis Family     Leukemia Family     Lung cancer Family     Other Family         SKIN DISORDER    Thyroid disease Family     Uterine cancer Family     Leukemia Sister 37    Cervical cancer Sister 28    No Known Problems Maternal Grandfather     No Known Problems Paternal Grandmother     No Known Problems Paternal Grandfather     Breast cancer Maternal Aunt 66     I have reviewed and agree with the history as documented  E-Cigarette/Vaping    E-Cigarette Use Never User      E-Cigarette/Vaping Substances    Nicotine No     THC No     CBD No     Flavoring No     Other No     Unknown No      Social History     Tobacco Use    Smoking status: Never Smoker    Smokeless tobacco: Never Used   Vaping Use    Vaping Use: Never used   Substance Use Topics    Alcohol use: Yes     Comment: SOCIAL    Drug use: No       Review of Systems   Gastrointestinal: Positive for abdominal pain  Physical Exam  Physical Exam  Vitals and nursing note reviewed  Constitutional:       General: She is not in acute distress  Appearance: She is well-developed  She is not diaphoretic  HENT:      Head: Normocephalic and atraumatic  Eyes:      Pupils: Pupils are equal, round, and reactive to light  Neck:      Vascular: No JVD  Trachea: No tracheal deviation  Cardiovascular:      Rate and Rhythm: Normal rate and regular rhythm  Heart sounds: Normal heart sounds  No murmur heard  No friction rub  No gallop  Pulmonary:      Effort: Pulmonary effort is normal  No respiratory distress  Breath sounds: Normal breath sounds  No wheezing or rales  Abdominal:      General: Bowel sounds are normal  There is no distension  Palpations: Abdomen is soft  Tenderness: There is no abdominal tenderness  There is no guarding or rebound  Skin:     General: Skin is warm and dry        Coloration: Skin is not pale  Neurological:      Mental Status: She is alert and oriented to person, place, and time  Cranial Nerves: No cranial nerve deficit  Motor: No abnormal muscle tone     Psychiatric:         Behavior: Behavior normal          Vital Signs  ED Triage Vitals [03/30/22 2121]   Temperature Pulse Respirations Blood Pressure SpO2   98 °F (36 7 °C) 95 18 (!) 197/119 100 %      Temp Source Heart Rate Source Patient Position - Orthostatic VS BP Location FiO2 (%)   Oral Monitor Lying Left arm --      Pain Score       --           Vitals:    03/30/22 2121   BP: (!) 197/119   Pulse: 95   Patient Position - Orthostatic VS: Lying         Visual Acuity      ED Medications  Medications   famotidine (PEPCID) tablet 40 mg (40 mg Oral Given 3/30/22 2227)   ondansetron (ZOFRAN-ODT) dispersible tablet 4 mg (4 mg Oral Given 3/30/22 2227)       Diagnostic Studies  Results Reviewed     Procedure Component Value Units Date/Time    Comprehensive metabolic panel [[id number]]  (Abnormal) Collected: 03/30/22 2146    Lab Status: Final result Specimen: Blood from Arm, Right Updated: 03/30/22 2207     Sodium 139 mmol/L      Potassium 3 8 mmol/L      Chloride 101 mmol/L      CO2 27 mmol/L      ANION GAP 11 mmol/L      BUN 10 mg/dL      Creatinine 0 90 mg/dL      Glucose 111 mg/dL      Calcium 9 5 mg/dL      AST 62 U/L       U/L      Alkaline Phosphatase 81 U/L      Total Protein 9 2 g/dL      Albumin 4 4 g/dL      Total Bilirubin 0 90 mg/dL      eGFR 71 ml/min/1 73sq m     Narrative:      [place] guidelines for Chronic Kidney Disease (CKD):     Stage 1 with normal or high GFR (GFR > 90 mL/min/1 73 square meters)    Stage 2 Mild CKD (GFR = 60-89 mL/min/1 73 square meters)    Stage 3A Moderate CKD (GFR = 45-59 mL/min/1 73 square meters)    Stage 3B Moderate CKD (GFR = 30-44 mL/min/1 73 square meters)    Stage 4 Severe CKD (GFR = 15-29 mL/min/1 73 square meters)    Stage 5 End Stage CKD (GFR <15 mL/min/1 73 square meters)  Note: GFR calculation is accurate only with a steady state creatinine    Lipase [[id number]]  (Normal) Collected: 03/30/22 2146    Lab Status: Final result Specimen: Blood from Arm, Right Updated: 03/30/22 2207     Lipase 74 u/L     CBC and differential [[id number]]  (Abnormal) Collected: 03/30/22 2146    Lab Status: Final result Specimen: Blood from Arm, Right Updated: 03/30/22 2151     WBC 11 03 Thousand/uL      RBC 5 02 Million/uL      Hemoglobin 15 6 g/dL      Hematocrit 47 6 %      MCV 95 fL      MCH 31 1 pg      MCHC 32 8 g/dL      RDW 13 4 %      MPV 9 3 fL      Platelets 309 Thousands/uL      nRBC 0 /100 WBCs      Neutrophils Relative 59 %      Immat GRANS % 0 %      Lymphocytes Relative 28 %      Monocytes Relative 11 %      Eosinophils Relative 1 %      Basophils Relative 1 %      Neutrophils Absolute 6 50 Thousands/µL      Immature Grans Absolute 0 02 Thousand/uL      Lymphocytes Absolute 3 11 Thousands/µL      Monocytes Absolute 1 21 Thousand/µL      Eosinophils Absolute 0 09 Thousand/µL      Basophils Absolute 0 10 Thousands/µL                  No orders to display              Procedures  Procedures         ED Course                               SBIRT 20yo+      Most Recent Value   SBIRT (22 yo +)    In order to provide better care to our patients, we are screening all of our patients for alcohol and drug use  Would it be okay to ask you these screening questions? No Filed at: 03/30/2022 2148                    [facility]  Number of Diagnoses or Management Options  Abdominal pain  Diagnosis management comments: 80-year-old female with abdominal pain, suspect GERD, she has a benign abdominal exam here, will check EKG to rule out cardiac causes of her discomfort the less likely, check abdominal labs, give check cocktail, reassess      Patient's symptoms much improved, EKG is reassuring, will discharge and strongly recommended she follow up with GI in the morning  Disposition  Final diagnoses:   Abdominal pain     Time reflects when diagnosis was documented in both MDM as applicable and the Disposition within this note     Time User Action Codes Description Comment    3/31/2022 12:01 AM Diego Vegas Dago [R10 9] Abdominal pain       ED Disposition     ED Disposition Condition Date/Time Comment    Discharge Stable Thu Mar 31, 2022 12:01 AM Kd Maria Elena Moy discharge to home/self care  Follow-up Information    None         Discharge Medication List as of 3/31/2022 12:03 AM      START taking these medications    Details   famotidine (PEPCID) 20 mg tablet Take 1 tablet (20 mg total) by mouth 2 (two) times a day, Starting Thu 3/31/2022, Normal      ondansetron (Zofran ODT) 4 mg disintegrating tablet Take 1 tablet (4 mg total) by mouth every 6 (six) hours as needed for nausea or vomiting, Starting Thu 3/31/2022, Normal         CONTINUE these medications which have NOT CHANGED    Details   calcium polycarbophil (FIBERCON) 625 mg tablet Take 625 mg by mouth daily As needed , Historical Med      cholecalciferol (VITAMIN D3) 1,000 units tablet Take by mouth, Historical Med      Coenzyme Q10 (CO Q 10) 100 MG CAPS Take by mouth, Historical Med      levothyroxine 100 mcg tablet Take 1 tablet (100 mcg total) by mouth daily, Starting Tue 3/29/2022, Normal      lovastatin (MEVACOR) 20 mg tablet Take 1 tablet (20 mg total) by mouth daily at bedtime, Starting Tue 3/29/2022, Normal      Magnesium 100 MG CAPS Take by mouth, Historical Med      MULTIPLE VITAMIN-FOLIC ACID PO Take by mouth, Historical Med      hyoscyamine (LEVSIN/SL) 0 125 mg SL tablet Take 1 tablet (0 125 mg total) by mouth every 4 (four) hours as needed (belching), Starting Tue 3/29/2022, Normal             No discharge procedures on file      PDMP Review     None          ED Provider  Electronically Signed by           Sloane Daigle MD  04/03/22 5380

## 2022-04-07 ENCOUNTER — ANESTHESIA EVENT (OUTPATIENT)
Dept: GASTROENTEROLOGY | Facility: HOSPITAL | Age: 57
End: 2022-04-07

## 2022-04-07 ENCOUNTER — HOSPITAL ENCOUNTER (OUTPATIENT)
Dept: GASTROENTEROLOGY | Facility: HOSPITAL | Age: 57
Setting detail: OUTPATIENT SURGERY
Discharge: HOME | End: 2022-04-07
Payer: COMMERCIAL

## 2022-04-07 ENCOUNTER — ANESTHESIA (OUTPATIENT)
Dept: GASTROENTEROLOGY | Facility: HOSPITAL | Age: 57
End: 2022-04-07

## 2022-04-07 VITALS
TEMPERATURE: 98.2 F | DIASTOLIC BLOOD PRESSURE: 70 MMHG | HEART RATE: 89 BPM | OXYGEN SATURATION: 95 % | RESPIRATION RATE: 16 BRPM | SYSTOLIC BLOOD PRESSURE: 136 MMHG

## 2022-04-07 DIAGNOSIS — R14.2 BELCHING: ICD-10-CM

## 2022-04-07 DIAGNOSIS — R13.19 ESOPHAGEAL DYSPHAGIA: ICD-10-CM

## 2022-04-07 DIAGNOSIS — R63.0 ANOREXIA: ICD-10-CM

## 2022-04-07 PROCEDURE — 88341 IMHCHEM/IMCYTCHM EA ADD ANTB: CPT | Performed by: PATHOLOGY

## 2022-04-07 PROCEDURE — 43239 EGD BIOPSY SINGLE/MULTIPLE: CPT | Performed by: INTERNAL MEDICINE

## 2022-04-07 PROCEDURE — 88342 IMHCHEM/IMCYTCHM 1ST ANTB: CPT | Performed by: PATHOLOGY

## 2022-04-07 PROCEDURE — 88313 SPECIAL STAINS GROUP 2: CPT | Performed by: PATHOLOGY

## 2022-04-07 PROCEDURE — 88305 TISSUE EXAM BY PATHOLOGIST: CPT | Performed by: PATHOLOGY

## 2022-04-07 RX ORDER — PANTOPRAZOLE SODIUM 40 MG/1
40 TABLET, DELAYED RELEASE ORAL 2 TIMES DAILY
Qty: 60 TABLET | Refills: 3 | Status: SHIPPED | OUTPATIENT
Start: 2022-04-07 | End: 2022-07-28

## 2022-04-07 RX ORDER — LIDOCAINE HYDROCHLORIDE 10 MG/ML
INJECTION, SOLUTION EPIDURAL; INFILTRATION; INTRACAUDAL; PERINEURAL AS NEEDED
Status: DISCONTINUED | OUTPATIENT
Start: 2022-04-07 | End: 2022-04-07

## 2022-04-07 RX ORDER — PROPOFOL 10 MG/ML
INJECTION, EMULSION INTRAVENOUS AS NEEDED
Status: DISCONTINUED | OUTPATIENT
Start: 2022-04-07 | End: 2022-04-07

## 2022-04-07 RX ORDER — SODIUM CHLORIDE, SODIUM LACTATE, POTASSIUM CHLORIDE, CALCIUM CHLORIDE 600; 310; 30; 20 MG/100ML; MG/100ML; MG/100ML; MG/100ML
125 INJECTION, SOLUTION INTRAVENOUS CONTINUOUS
Status: DISCONTINUED | OUTPATIENT
Start: 2022-04-07 | End: 2022-04-11 | Stop reason: HOSPADM

## 2022-04-07 RX ADMIN — PROPOFOL 150 MG: 10 INJECTION, EMULSION INTRAVENOUS at 09:58

## 2022-04-07 RX ADMIN — PROPOFOL 50 MG: 10 INJECTION, EMULSION INTRAVENOUS at 10:00

## 2022-04-07 RX ADMIN — SODIUM CHLORIDE, SODIUM LACTATE, POTASSIUM CHLORIDE, AND CALCIUM CHLORIDE: .6; .31; .03; .02 INJECTION, SOLUTION INTRAVENOUS at 09:49

## 2022-04-07 RX ADMIN — PROPOFOL 50 MG: 10 INJECTION, EMULSION INTRAVENOUS at 10:04

## 2022-04-07 RX ADMIN — PROPOFOL 50 MG: 10 INJECTION, EMULSION INTRAVENOUS at 10:02

## 2022-04-07 RX ADMIN — LIDOCAINE HYDROCHLORIDE 50 MG: 10 INJECTION, SOLUTION EPIDURAL; INFILTRATION; INTRACAUDAL at 09:58

## 2022-04-07 NOTE — ANESTHESIA POSTPROCEDURE EVALUATION
Post-Op Assessment Note    CV Status:  Stable  Pain Score: 0    Pain management: adequate     Mental Status:  Alert and awake   Hydration Status:  Euvolemic   PONV Controlled:  Controlled   Airway Patency:  Patent      Post Op Vitals Reviewed: Yes      Staff: CRNA         No complications documented      /59 (04/07/22 1013)    Temp 98 2 °F (36 8 °C) (04/07/22 1013)    Pulse 83 (04/07/22 1013)   Resp 14 (04/07/22 1013)    SpO2 92 % (04/07/22 1013)

## 2022-04-07 NOTE — ANESTHESIA PREPROCEDURE EVALUATION
Procedure:  EGD    Relevant Problems   CARDIO   (+) Hyperlipidemia      ENDO   (+) Hypothyroidism        Physical Exam    Airway    Mallampati score: III  TM Distance: >3 FB  Neck ROM: full     Dental   No notable dental hx     Cardiovascular      Pulmonary      Other Findings        Anesthesia Plan  ASA Score- 3     Anesthesia Type- IV sedation with anesthesia with ASA Monitors  Additional Monitors:   Airway Plan:           Plan Factors-Exercise tolerance (METS): >4 METS  Chart reviewed  Patient summary reviewed  Patient is not a current smoker  Induction- intravenous  Postoperative Plan-     Informed Consent- Anesthetic plan and risks discussed with patient  I personally reviewed this patient with the CRNA  Discussed and agreed on the Anesthesia Plan with the CRNA  Meenakshi Torres

## 2022-04-07 NOTE — H&P
History and Physical - [facility]  Eliana Santana Chinmay 64 y o  female MRN: [id number]    HPI: Felicia Elmore is a 64y o  year old female who presents with GERD, dysphagia  Review of Systems    Historical Information   Past Medical History:   Diagnosis Date    Arthritis     Colon polyp     Hypercholesterolemia     Hypothyroidism      Past Surgical History:   Procedure Laterality Date    COLONOSCOPY      DILATION AND CURETTAGE OF UTERUS      ENDOMETRIAL BIOPSY      BY SUCTION     Social History   Social History     Substance and Sexual Activity   Alcohol Use Yes    Comment: SOCIAL     Social History     Substance and Sexual Activity   Drug Use No     Social History     Tobacco Use   Smoking Status Never Smoker   Smokeless Tobacco Never Used     Family History   Problem Relation Age of Onset    Breast cancer Mother 62    Hypertension Father     Colon cancer Sister 50    Heart disease Sister     Hypertension Sister     Diabetes Maternal Grandmother     Arthritis Family     Leukemia Family     Lung cancer Family     Other Family         SKIN DISORDER    Thyroid disease Family     Uterine cancer Family     Leukemia Sister 37    Cervical cancer Sister 28    No Known Problems Maternal Grandfather     No Known Problems Paternal Grandmother     No Known Problems Paternal Grandfather     Breast cancer Maternal Aunt 66       Meds/Allergies     (Not in a hospital admission)      Allergies   Allergen Reactions    Penicillins Hives       Objective     BP (!) 186/84   Pulse 101   Temp 97 7 °F (36 5 °C) (Temporal)   Resp 20   SpO2 95%       PHYSICAL EXAM    Gen: NAD  CV: RRR  CHEST: Clear  ABD: soft, NT/ND  EXT: no edema  Neuro: AAO      ASSESSMENT/PLAN:  This is a 64y o  year old female here for  GERD, dysphagia         PLAN:   Procedure: Brittany Adam

## 2022-04-14 ENCOUNTER — TELEPHONE (OUTPATIENT)
Dept: OTHER | Facility: OTHER | Age: 57
End: 2022-04-14

## 2022-04-14 NOTE — TELEPHONE ENCOUNTER
Spoke with patient, reviewed provider recommendations   Patient verbalized understanding, no further questions

## 2022-04-14 NOTE — TELEPHONE ENCOUNTER
Patient called regarding the side effects of the new medication she was placed on pantoprazole (PROTONIX) 40 mg tablet  She is having excessive dry mouth, she also can only eat or drink very little then burps a lot which is preventing her from [address]  Please give patient a call back regarding her concerns

## 2022-04-14 NOTE — TELEPHONE ENCOUNTER
Please Advise  Yodit Reyna PA-C  Last seen: 3/31/22  EGD: 4/7/22- hiatal hernia, grade A esophagitis   Hx: Esophageal dysphagia with belching and anorexia, elevated LFTs     Spoke with patient, c/o dry mouth and excessive belching  Patient has been taking pantoprazole 40mg BID on an empty stomach since 4/7 doesn't see any improvement  She has more of an appetite but can not drink water like she used to  "Just a few sips here and there"   Denies: N/V, fevers, lightheadedness, dizziness     Recs:  - Advised to take carafate before oral intake, dissolve tablet in a tablespoon or two of water and drink    - Advised I would defer recs to provider in regards to dry mouth- advised she needs to stay hydrated to avoid dry mouth  - Advised pantoprazole can take a few weeks to work to full potential    - Patient was going to get lab work done for hepatic function panel but has not been able to drink too much and was wondering if she should wait to get it done   Advised I would speak to provider

## 2022-04-14 NOTE — TELEPHONE ENCOUNTER
Thank you  She can  Biotene OTC which is a mouth rinse which treats dry mouth  I agree with hydration  She can get lab work done at any time

## 2022-04-15 ENCOUNTER — APPOINTMENT (OUTPATIENT)
Dept: LAB | Facility: MEDICAL CENTER | Age: 57
End: 2022-04-15
Payer: COMMERCIAL

## 2022-04-15 DIAGNOSIS — R79.89 ELEVATED LFTS: ICD-10-CM

## 2022-04-15 LAB
ALBUMIN SERPL BCP-MCNC: 4.1 G/DL (ref 3.5–5)
ALP SERPL-CCNC: 94 U/L (ref 46–116)
ALT SERPL W P-5'-P-CCNC: 145 U/L (ref 12–78)
AST SERPL W P-5'-P-CCNC: 61 U/L (ref 5–45)
BILIRUB DIRECT SERPL-MCNC: 0.22 MG/DL (ref 0–0.2)
BILIRUB SERPL-MCNC: 0.74 MG/DL (ref 0.2–1)
PROT SERPL-MCNC: 7.9 G/DL (ref 6.4–8.2)

## 2022-04-15 PROCEDURE — 80076 HEPATIC FUNCTION PANEL: CPT

## 2022-04-15 PROCEDURE — 36415 COLL VENOUS BLD VENIPUNCTURE: CPT

## 2022-04-25 ENCOUNTER — TELEPHONE (OUTPATIENT)
Dept: GASTROENTEROLOGY | Facility: AMBULARY SURGERY CENTER | Age: 57
End: 2022-04-25

## 2022-04-25 NOTE — TELEPHONE ENCOUNTER
----- Message from David Ledezma LPN sent at 9/99/0667 11:37 AM EDT -----  Can we schedule appoint with Dr Hailee Rader in 6 months? Thank You!  ----- Message -----  From: Desirae Yoo MD  Sent: 4/25/2022   6:28 AM EDT  To: [facility]    The biopsies from your recent upper endoscopy were negative for H pylori, negative for Reeder's esophagus, negative for eosinophilic esophagitis all of which is good news  As we discussed, please take the pantoprazole twice daily  Please let us know if he continued to have difficulty swallowing and acid reflux changes  Otherwise please follow-up in the office in 6 months time with myself, please call with any questions or concerns

## 2022-05-04 ENCOUNTER — APPOINTMENT (OUTPATIENT)
Dept: LAB | Facility: MEDICAL CENTER | Age: 57
End: 2022-05-04
Payer: COMMERCIAL

## 2022-05-04 DIAGNOSIS — R74.8 ELEVATED LIVER ENZYMES: ICD-10-CM

## 2022-05-04 LAB
FERRITIN SERPL-MCNC: 86 NG/ML (ref 8–388)
HBV CORE AB SER QL: NORMAL
HBV CORE IGM SER QL: NORMAL
HBV SURFACE AG SER QL: NORMAL
HCV AB SER QL: NORMAL
IRON SATN MFR SERPL: 31 % (ref 15–50)
IRON SERPL-MCNC: 96 UG/DL (ref 50–170)
TIBC SERPL-MCNC: 307 UG/DL (ref 250–450)

## 2022-05-04 PROCEDURE — 86038 ANTINUCLEAR ANTIBODIES: CPT

## 2022-05-04 PROCEDURE — 87340 HEPATITIS B SURFACE AG IA: CPT

## 2022-05-04 PROCEDURE — 83540 ASSAY OF IRON: CPT

## 2022-05-04 PROCEDURE — 82728 ASSAY OF FERRITIN: CPT

## 2022-05-04 PROCEDURE — 86705 HEP B CORE ANTIBODY IGM: CPT

## 2022-05-04 PROCEDURE — 83550 IRON BINDING TEST: CPT

## 2022-05-04 PROCEDURE — 36415 COLL VENOUS BLD VENIPUNCTURE: CPT

## 2022-05-04 PROCEDURE — 86803 HEPATITIS C AB TEST: CPT

## 2022-05-04 PROCEDURE — 86015 ACTIN ANTIBODY EACH: CPT

## 2022-05-04 PROCEDURE — 86381 MITOCHONDRIAL ANTIBODY EACH: CPT

## 2022-05-04 PROCEDURE — 86704 HEP B CORE ANTIBODY TOTAL: CPT

## 2022-05-05 LAB
ACTIN IGG SERPL-ACNC: 14 UNITS (ref 0–19)
ANA HOMOGEN TITR SER: NORMAL {TITER}
ANA TITR SER IF: POSITIVE {TITER}
MITOCHONDRIA M2 IGG SER-ACNC: <20 UNITS (ref 0–20)
SL AMB NOTE:: NORMAL

## 2022-05-24 ENCOUNTER — HOSPITAL ENCOUNTER (OUTPATIENT)
Dept: RADIOLOGY | Facility: MEDICAL CENTER | Age: 57
Discharge: HOME | End: 2022-05-24
Payer: COMMERCIAL

## 2022-05-24 ENCOUNTER — APPOINTMENT (OUTPATIENT)
Dept: LAB | Facility: MEDICAL CENTER | Age: 57
End: 2022-05-24
Payer: COMMERCIAL

## 2022-05-24 VITALS — WEIGHT: 293 LBS | BODY MASS INDEX: 48.82 KG/M2 | HEIGHT: 65 IN

## 2022-05-24 DIAGNOSIS — Z12.31 ENCOUNTER FOR SCREENING MAMMOGRAM FOR MALIGNANT NEOPLASM OF BREAST: ICD-10-CM

## 2022-05-24 DIAGNOSIS — R74.8 ELEVATED LIVER ENZYMES: ICD-10-CM

## 2022-05-24 LAB
ALBUMIN SERPL BCP-MCNC: 3.9 G/DL (ref 3.5–5)
ALP SERPL-CCNC: 76 U/L (ref 46–116)
ALT SERPL W P-5'-P-CCNC: 34 U/L (ref 12–78)
AST SERPL W P-5'-P-CCNC: 23 U/L (ref 5–45)
BILIRUB DIRECT SERPL-MCNC: 0.09 MG/DL (ref 0–0.2)
BILIRUB SERPL-MCNC: 0.56 MG/DL (ref 0.2–1)
PROT SERPL-MCNC: 8 G/DL (ref 6.4–8.2)

## 2022-05-24 PROCEDURE — 77063 BREAST TOMOSYNTHESIS BI: CPT

## 2022-05-24 PROCEDURE — 80076 HEPATIC FUNCTION PANEL: CPT

## 2022-05-24 PROCEDURE — 36415 COLL VENOUS BLD VENIPUNCTURE: CPT

## 2022-05-24 PROCEDURE — 77067 SCR MAMMO BI INCL CAD: CPT

## 2022-06-13 ENCOUNTER — TELEPHONE (OUTPATIENT)
Dept: GASTROENTEROLOGY | Facility: CLINIC | Age: 57
End: 2022-06-13

## 2022-06-13 NOTE — TELEPHONE ENCOUNTER
Patients GI provider:  Dr Enrrique Suosa    Number to return call: [phone]    Reason for call: Pt calling to schedule overdue colonoscopy     Scheduled procedure/appointment date if applicable: Apt/procedure 4/7/22(EGD)  9/30/22(FU)

## 2022-06-14 ENCOUNTER — TELEPHONE (OUTPATIENT)
Dept: GASTROENTEROLOGY | Facility: CLINIC | Age: 57
End: 2022-06-14

## 2022-06-14 NOTE — TELEPHONE ENCOUNTER
Patient is scheduled for colonoscopy on October 6, 2022 at [facility] with Linda Hollins MD  Patient is aware of pre-procedure prep of Miralax/Dulcolax and they will be called the day prior between 2 and 6 pm for time to report for procedure  Pre-procedure prep has been given to the patient  via [address] mail on June 14 , 2022

## 2022-07-18 ENCOUNTER — ANNUAL EXAM (OUTPATIENT)
Dept: OBGYN CLINIC | Facility: MEDICAL CENTER | Age: 57
End: 2022-07-18
Payer: COMMERCIAL

## 2022-07-18 VITALS
HEIGHT: 64 IN | DIASTOLIC BLOOD PRESSURE: 82 MMHG | BODY MASS INDEX: 49.61 KG/M2 | SYSTOLIC BLOOD PRESSURE: 142 MMHG | WEIGHT: 290.6 LBS

## 2022-07-18 DIAGNOSIS — N92.6 IRREGULAR MENSES: ICD-10-CM

## 2022-07-18 DIAGNOSIS — Z01.419 ENCNTR FOR GYN EXAM (GENERAL) (ROUTINE) W/O ABN FINDINGS: Primary | ICD-10-CM

## 2022-07-18 DIAGNOSIS — Z12.31 ENCOUNTER FOR SCREENING MAMMOGRAM FOR MALIGNANT NEOPLASM OF BREAST: ICD-10-CM

## 2022-07-18 PROCEDURE — S0612 ANNUAL GYNECOLOGICAL EXAMINA: HCPCS | Performed by: PHYSICIAN ASSISTANT

## 2022-07-18 NOTE — PROGRESS NOTES
Rosemary Mariee Chinmay   1965    CC:  Yearly exam    S:  64 y o  female here for yearly exam  She is perimenopausal; she had some spotting in January and February, nothing for March, and some spotting in April  She had nothing until July when she had some brownish discharge  She denies vaginal discharge, itching, odor or dryness  Sexual activity: She is sexually active without pain, bleeding or dryness  Last Pap: 5/18/20 neg/neg  Last Mammo:  5/24/22 neg  Last Colonoscopy: 2016 (she declined because she did not think she could tolerate the prep, GI following this - scheduled for 10/6/22)    We reviewed ASC guidelines for Pap testing       Family hx of breast cancer: mother, maternal aunt   Family hx of ovarian cancer: no  Family hx of colon cancer: sister      Current Outpatient Medications:     calcium polycarbophil (FIBERCON) 625 mg tablet, Take 625 mg by mouth daily As needed , Disp: , Rfl:     cholecalciferol (VITAMIN D3) 1,000 units tablet, Take by mouth, Disp: , Rfl:     Coenzyme Q10 (CO Q 10) 100 MG CAPS, Take by mouth, Disp: , Rfl:     famotidine (PEPCID) 20 mg tablet, Take 1 tablet (20 mg total) by mouth 2 (two) times a day, Disp: 30 tablet, Rfl: 0    levothyroxine 100 mcg tablet, Take 1 tablet (100 mcg total) by mouth daily, Disp: 90 tablet, Rfl: 1    lovastatin (MEVACOR) 20 mg tablet, Take 1 tablet (20 mg total) by mouth daily at bedtime, Disp: 90 tablet, Rfl: 1    Magnesium 100 MG CAPS, Take by mouth, Disp: , Rfl:     MULTIPLE VITAMIN-FOLIC ACID PO, Take by mouth, Disp: , Rfl:     ondansetron (Zofran ODT) 4 mg disintegrating tablet, Take 1 tablet (4 mg total) by mouth every 6 (six) hours as needed for nausea or vomiting, Disp: 20 tablet, Rfl: 0    pantoprazole (PROTONIX) 40 mg tablet, Take 1 tablet (40 mg total) by mouth 2 (two) times a day, Disp: 60 tablet, Rfl: 3    sucralfate (CARAFATE) 1 g tablet, Take 1 tablet (1 g total) by mouth 3 (three) times a day before meals, Disp: 90 tablet, Rfl: 2  Social History     Socioeconomic History    Marital status: /Civil Union     Spouse name: Not on file    Number of children: Not on file    Years of education: Not on file    Highest education level: Not on file   Occupational History    Not on file   Tobacco Use    Smoking status: Never Smoker    Smokeless tobacco: Never Used   Vaping Use    Vaping Use: Never used   Substance and Sexual Activity    Alcohol use: Yes     Comment: SOCIAL    Drug use: No    Sexual activity: Yes     Partners: Male   Other Topics Concern    Not on file   Social History Narrative    DAILY COFFEE CONSUMPTION (1 CUPS/DAY)    USES SAFETY EQUIPMENT - [employer]     Social Determinants of Health     Financial Resource Strain: Not on file   Food Insecurity: Not on file   Transportation Needs: Not on file   Physical Activity: Not on file   Stress: Not on file   Social Connections: Not on file   Intimate Partner Violence: Not on file   Housing Stability: Not on file     Family History   Problem Relation Age of Onset    Breast cancer Mother 62    Hypertension Father     Colon cancer Sister 50    Heart disease Sister     Hypertension Sister     Leukemia Sister 37    Cervical cancer Sister 28    Diabetes Maternal Grandmother     No Known Problems Maternal Grandfather     No Known Problems Paternal Grandmother     No Known Problems Paternal Grandfather     Breast cancer Maternal Aunt 66    Arthritis Family     Leukemia Family     Lung cancer Family     Other Family         SKIN DISORDER    Thyroid disease Family     Uterine cancer Family      Past Medical History:   Diagnosis Date    Arthritis     Colon polyp     Hypercholesterolemia     Hypothyroidism         Review of Systems   Respiratory: Negative  Cardiovascular: Negative  Gastrointestinal: Negative for constipation and diarrhea     Genitourinary: Negative for difficulty urinating, pelvic pain, vaginal bleeding, vaginal discharge, itching or odor  O:  Blood pressure 142/82, height 5' 3 78" (1 62 m), weight 132 kg (290 lb 9 6 oz), last menstrual period 07/16/2022  Patient appears well and is not in distress  Neck is supple without masses  Breasts are symmetrical without mass, tenderness, nipple discharge, skin changes or adenopathy  Abdomen is soft and nontender without masses  External genitals are normal without lesions or rashes  Urethral meatus and urethra are normal  Bladder is normal to palpation  Vagina is normal without discharge or bleeding  Cervix is normal without discharge or lesion  Uterus is normal, mobile, nontender without palpable mass  Adnexa are normal, nontender, without palpable mass  A:   Yearly exam      P:   Pap 2025  Mammo slip given   Colonoscopy due, has appt 10/2022    We reviewed that most women would be finished with periods by this point, however, this could still be normal  Will check a pelvic US now and if her endometrium is thickened will need an EMB    RTO one year for yearly exam or sooner as needed

## 2022-07-28 DIAGNOSIS — R13.19 ESOPHAGEAL DYSPHAGIA: ICD-10-CM

## 2022-07-28 RX ORDER — PANTOPRAZOLE SODIUM 40 MG/1
TABLET, DELAYED RELEASE ORAL
Qty: 60 TABLET | Refills: 3 | Status: SHIPPED | OUTPATIENT
Start: 2022-07-28 | End: 2022-08-10 | Stop reason: SDUPTHER

## 2022-08-10 DIAGNOSIS — E03.9 HYPOTHYROIDISM, UNSPECIFIED TYPE: ICD-10-CM

## 2022-08-10 DIAGNOSIS — R13.19 ESOPHAGEAL DYSPHAGIA: ICD-10-CM

## 2022-08-10 DIAGNOSIS — E78.2 MIXED HYPERLIPIDEMIA: ICD-10-CM

## 2022-08-10 RX ORDER — PANTOPRAZOLE SODIUM 40 MG/1
40 TABLET, DELAYED RELEASE ORAL 2 TIMES DAILY
Qty: 180 TABLET | Refills: 1 | Status: SHIPPED | OUTPATIENT
Start: 2022-08-10 | End: 2022-09-29 | Stop reason: SDUPTHER

## 2022-08-10 RX ORDER — LOVASTATIN 20 MG/1
20 TABLET ORAL
Qty: 90 TABLET | Refills: 1 | Status: SHIPPED | OUTPATIENT
Start: 2022-08-10

## 2022-08-10 RX ORDER — LEVOTHYROXINE SODIUM 0.1 MG/1
100 TABLET ORAL DAILY
Qty: 90 TABLET | Refills: 1 | Status: SHIPPED | OUTPATIENT
Start: 2022-08-10

## 2022-08-11 ENCOUNTER — HOSPITAL ENCOUNTER (OUTPATIENT)
Dept: RADIOLOGY | Facility: MEDICAL CENTER | Age: 57
Discharge: HOME | End: 2022-08-11
Payer: COMMERCIAL

## 2022-08-11 DIAGNOSIS — N92.6 IRREGULAR MENSES: ICD-10-CM

## 2022-08-11 PROCEDURE — 76830 TRANSVAGINAL US NON-OB: CPT

## 2022-08-11 PROCEDURE — 76856 US EXAM PELVIC COMPLETE: CPT

## 2022-08-16 ENCOUNTER — TELEPHONE (OUTPATIENT)
Dept: OBGYN CLINIC | Facility: CLINIC | Age: 57
End: 2022-08-16

## 2022-08-16 NOTE — TELEPHONE ENCOUNTER
----- Message from Meeta Sims MD sent at 8/16/2022 12:05 PM EDT -----  Arnold Spivey pt - fibroid uterus, thin endometrial stripe  Nothing further to be done for now  Should set up appt if any issues

## 2022-09-29 ENCOUNTER — OFFICE VISIT (OUTPATIENT)
Dept: GASTROENTEROLOGY | Facility: AMBULARY SURGERY CENTER | Age: 57
End: 2022-09-29
Payer: COMMERCIAL

## 2022-09-29 VITALS
OXYGEN SATURATION: 98 % | DIASTOLIC BLOOD PRESSURE: 70 MMHG | HEIGHT: 65 IN | HEART RATE: 90 BPM | WEIGHT: 293 LBS | BODY MASS INDEX: 48.82 KG/M2 | SYSTOLIC BLOOD PRESSURE: 154 MMHG

## 2022-09-29 DIAGNOSIS — R13.19 ESOPHAGEAL DYSPHAGIA: ICD-10-CM

## 2022-09-29 PROCEDURE — 99213 OFFICE O/P EST LOW 20 MIN: CPT | Performed by: INTERNAL MEDICINE

## 2022-09-29 RX ORDER — PANTOPRAZOLE SODIUM 40 MG/1
40 TABLET, DELAYED RELEASE ORAL 2 TIMES DAILY
Qty: 180 TABLET | Refills: 2 | Status: SHIPPED | OUTPATIENT
Start: 2022-09-29

## 2022-09-29 NOTE — LETTER
September 29, 2022     Harden Schwab, PA-C  [address]    Patient: Leslee Moy   YOB: 1965   Date of Visit: 9/29/2022       Dear Dr Hu Arrow:    Thank you for referring Carlee Martinez to me for evaluation  Below are my notes for this consultation  If you have questions, please do not hesitate to call me  I look forward to following your patient along with you  Sincerely,        Stefany Damon MD        CC: No Recipients  Stefany Damon MD  9/29/2022  9:45 PM  Sign when Signing Visit  [facility]  Leslee Moy 64 y o  female MRN: [id number]            Assessment & Plan:    75-year-old female here for follow-up of esophageal stage found to have significant esophagitis on endoscopic evaluation    1  Dysphagia: Secondary to reflux esophagitis   -continue pantoprazole, can try to reduce to once daily if tolerated   -no need for endoscopic re-evaluation at this time   -patient should follow-up in 1 year's time, call if any change or worsening symptoms in the interim   -no need for esophageal manometry at this time    2  Colon cancer screening:  Patient is scheduled for repeat colonoscopy in the next few weeks      Gia Herring was seen today for follow-up  Diagnoses and all orders for this visit:    Esophageal dysphagia  -     pantoprazole (PROTONIX) 40 mg tablet; Take 1 tablet (40 mg total) by mouth 2 (two) times a day            _____________________________________________________________        CC: Follow-up    HPI:  Joselin Paul is a 64 y  o female who is here for follow-up of GERD and dysphagia  Pleasant 75-year-old female, history of hyperlipidemia, hypothyroidism, initially seen for symptoms of dysphagia, found to have reflux esophagitis on endoscopic evaluation started on PPI therapy  Reports having good control symptoms, no further reflux  Denies any dysphagia    Appetite is good, denies any nausea, vomiting  Denies any side effects to medications  Denies any diarrhea, constipation  ROS:  The remainder of the ROS was negative except for the pertinent positives mentioned in HPI        Allergies: Penicillins    Medications:   Current Outpatient Medications:     calcium polycarbophil (FIBERCON) 625 mg tablet, Take 625 mg by mouth daily As needed , Disp: , Rfl:     cholecalciferol (VITAMIN D3) 1,000 units tablet, Take by mouth, Disp: , Rfl:     Coenzyme Q10 (CO Q 10) 100 MG CAPS, Take by mouth, Disp: , Rfl:     famotidine (PEPCID) 20 mg tablet, Take 1 tablet (20 mg total) by mouth 2 (two) times a day, Disp: 30 tablet, Rfl: 0    levothyroxine 100 mcg tablet, Take 1 tablet (100 mcg total) by mouth daily, Disp: 90 tablet, Rfl: 1    lovastatin (MEVACOR) 20 mg tablet, Take 1 tablet (20 mg total) by mouth daily at bedtime, Disp: 90 tablet, Rfl: 1    Magnesium 100 MG CAPS, Take by mouth, Disp: , Rfl:     MULTIPLE VITAMIN-FOLIC ACID PO, Take by mouth, Disp: , Rfl:     ondansetron (Zofran ODT) 4 mg disintegrating tablet, Take 1 tablet (4 mg total) by mouth every 6 (six) hours as needed for nausea or vomiting, Disp: 20 tablet, Rfl: 0    pantoprazole (PROTONIX) 40 mg tablet, Take 1 tablet (40 mg total) by mouth 2 (two) times a day, Disp: 180 tablet, Rfl: 2    Past Medical History:   Diagnosis Date    Arthritis     Colon polyp     Hypercholesterolemia     Hypothyroidism        Past Surgical History:   Procedure Laterality Date    COLONOSCOPY      DILATION AND CURETTAGE OF UTERUS      ENDOMETRIAL BIOPSY      BY SUCTION       Family History   Problem Relation Age of Onset    Breast cancer Mother 62    Hypertension Father     Colon cancer Sister 50    Heart disease Sister     Hypertension Sister     Leukemia Sister 37    Cervical cancer Sister 28    Diabetes Maternal Grandmother     No Known Problems Maternal Grandfather     No Known Problems Paternal Grandmother     No Known Problems Paternal Grandfather     Breast cancer Maternal Aunt 66    Arthritis Family     Leukemia Family     Lung cancer Family     Other Family         SKIN DISORDER    Thyroid disease Family     Uterine cancer Family         reports that she has never smoked  She has never used smokeless tobacco  She reports current alcohol use  She reports that she does not use drugs        Physical Exam:    /70 (BP Location: Left arm, Patient Position: Sitting, Cuff Size: Standard)   Pulse 90   Ht 5' 5" (1 651 m)   Wt 136 kg (299 lb)   SpO2 98%   BMI 49 76 kg/m²     Gen: wn/wd, NAD, morbidly obese  HEENT: anicteric, MMM, no cervical LAD  CVS: RRR, no m/r/g  CHEST: CTA b/l  ABD: +BS, soft, NT,ND, no hepatosplenomegaly  EXT: no c/c/e  NEURO: aaox3  SKIN: NO rashes

## 2022-09-30 NOTE — PROGRESS NOTES
[address] [facility]  Aung Moy 64 y o  female MRN: [id number]            Assessment & Plan:    49-year-old female here for follow-up of esophageal stage found to have significant esophagitis on endoscopic evaluation    1  Dysphagia: Secondary to reflux esophagitis   -continue pantoprazole, can try to reduce to once daily if tolerated   -no need for endoscopic re-evaluation at this time   -patient should follow-up in 1 year's time, call if any change or worsening symptoms in the interim   -no need for esophageal manometry at this time    2  Colon cancer screening:  Patient is scheduled for repeat colonoscopy in the next few weeks      Maria T Guzman was seen today for follow-up  Diagnoses and all orders for this visit:    Esophageal dysphagia  -     pantoprazole (PROTONIX) 40 mg tablet; Take 1 tablet (40 mg total) by mouth 2 (two) times a day            _____________________________________________________________        CC: Follow-up    HPI:  Gilles Sebastian is a 64 y  o female who is here for follow-up of GERD and dysphagia  Pleasant 49-year-old female, history of hyperlipidemia, hypothyroidism, initially seen for symptoms of dysphagia, found to have reflux esophagitis on endoscopic evaluation started on PPI therapy  Reports having good control symptoms, no further reflux  Denies any dysphagia  Appetite is good, denies any nausea, vomiting  Denies any side effects to medications  Denies any diarrhea, constipation  ROS:  The remainder of the ROS was negative except for the pertinent positives mentioned in HPI        Allergies: Penicillins    Medications:   Current Outpatient Medications:     calcium polycarbophil (FIBERCON) 625 mg tablet, Take 625 mg by mouth daily As needed , Disp: , Rfl:     cholecalciferol (VITAMIN D3) 1,000 units tablet, Take by mouth, Disp: , Rfl:     Coenzyme Q10 (CO Q 10) 100 MG CAPS, Take by mouth, Disp: , Rfl:     famotidine (PEPCID) 20 mg tablet, Take 1 tablet (20 mg total) by mouth 2 (two) times a day, Disp: 30 tablet, Rfl: 0    levothyroxine 100 mcg tablet, Take 1 tablet (100 mcg total) by mouth daily, Disp: 90 tablet, Rfl: 1    lovastatin (MEVACOR) 20 mg tablet, Take 1 tablet (20 mg total) by mouth daily at bedtime, Disp: 90 tablet, Rfl: 1    Magnesium 100 MG CAPS, Take by mouth, Disp: , Rfl:     MULTIPLE VITAMIN-FOLIC ACID PO, Take by mouth, Disp: , Rfl:     ondansetron (Zofran ODT) 4 mg disintegrating tablet, Take 1 tablet (4 mg total) by mouth every 6 (six) hours as needed for nausea or vomiting, Disp: 20 tablet, Rfl: 0    pantoprazole (PROTONIX) 40 mg tablet, Take 1 tablet (40 mg total) by mouth 2 (two) times a day, Disp: 180 tablet, Rfl: 2    Past Medical History:   Diagnosis Date    Arthritis     Colon polyp     Hypercholesterolemia     Hypothyroidism        Past Surgical History:   Procedure Laterality Date    COLONOSCOPY      DILATION AND CURETTAGE OF UTERUS      ENDOMETRIAL BIOPSY      BY SUCTION       Family History   Problem Relation Age of Onset    Breast cancer Mother 62    Hypertension Father     Colon cancer Sister 50    Heart disease Sister     Hypertension Sister     Leukemia Sister 37    Cervical cancer Sister 28    Diabetes Maternal Grandmother     No Known Problems Maternal Grandfather     No Known Problems Paternal Grandmother     No Known Problems Paternal Grandfather     Breast cancer Maternal Aunt 66    Arthritis Family     Leukemia Family     Lung cancer Family     Other Family         SKIN DISORDER    Thyroid disease Family     Uterine cancer Family         reports that she has never smoked  She has never used smokeless tobacco  She reports current alcohol use  She reports that she does not use drugs        Physical Exam:    /70 (BP Location: Left arm, Patient Position: Sitting, Cuff Size: Standard)   Pulse 90   Ht 5' 5" (1 651 m)   Wt 136 kg (299 lb)   SpO2 98%   BMI 49 76 kg/m²     Gen: wn/wd, NAD, morbidly obese  HEENT: anicteric, MMM, no cervical LAD  CVS: RRR, no m/r/g  CHEST: CTA b/l  ABD: +BS, soft, NT,ND, no hepatosplenomegaly  EXT: no c/c/e  NEURO: aaox3  SKIN: NO rashes

## 2022-12-01 DIAGNOSIS — R13.19 ESOPHAGEAL DYSPHAGIA: ICD-10-CM

## 2022-12-01 RX ORDER — PANTOPRAZOLE SODIUM 40 MG/1
40 TABLET, DELAYED RELEASE ORAL 2 TIMES DAILY
Qty: 180 TABLET | Refills: 2 | Status: SHIPPED | OUTPATIENT
Start: 2022-12-01

## 2022-12-01 NOTE — TELEPHONE ENCOUNTER
----- Message from Mercantecjeannine Moy sent at 12/1/2022  9:54 AM EST -----  Regarding: Pantoprazole Refill  Contact: [phone]  Tried filling my script ([id number]) on Dec  1st with [facility], it is showing 2 refills and expiring 9/29/23 but for some reason they canceled it  Would you please send a new script to them  Thank Jadyn Dawn

## 2022-12-05 ENCOUNTER — TELEPHONE (OUTPATIENT)
Dept: GASTROENTEROLOGY | Facility: CLINIC | Age: 57
End: 2022-12-05

## 2022-12-05 NOTE — TELEPHONE ENCOUNTER
Scheduled date of colonoscopy (as of today):02/13/23  Physician performing colonoscopy:Dr Kamila Wise  [facility] of [facility]  Bowel prep reviewed with patient:has instructions from previously scheduled colonoscopy in Oct 2022  Instructions reviewed with patient by:asif  Clearances: n/a

## 2022-12-09 ENCOUNTER — TELEPHONE (OUTPATIENT)
Dept: GASTROENTEROLOGY | Facility: CLINIC | Age: 57
End: 2022-12-09

## 2022-12-09 NOTE — TELEPHONE ENCOUNTER
Left message informing patient that her procedure needs to be rescheduled to 02/20/23 from 02/13/23 due to change in provider's schedule

## 2023-01-26 ENCOUNTER — OFFICE VISIT (OUTPATIENT)
Dept: FAMILY MEDICINE CLINIC | Facility: CLINIC | Age: 58
End: 2023-01-26

## 2023-01-26 VITALS
WEIGHT: 293 LBS | HEIGHT: 65 IN | DIASTOLIC BLOOD PRESSURE: 76 MMHG | BODY MASS INDEX: 48.82 KG/M2 | SYSTOLIC BLOOD PRESSURE: 160 MMHG | TEMPERATURE: 98 F | OXYGEN SATURATION: 92 % | HEART RATE: 101 BPM

## 2023-01-26 DIAGNOSIS — E03.9 HYPOTHYROIDISM, UNSPECIFIED TYPE: Primary | ICD-10-CM

## 2023-01-26 DIAGNOSIS — J01.00 ACUTE MAXILLARY SINUSITIS, RECURRENCE NOT SPECIFIED: ICD-10-CM

## 2023-01-26 DIAGNOSIS — E78.2 MIXED HYPERLIPIDEMIA: ICD-10-CM

## 2023-01-26 DIAGNOSIS — J40 BRONCHITIS: ICD-10-CM

## 2023-01-26 RX ORDER — LOVASTATIN 20 MG/1
20 TABLET ORAL
Qty: 90 TABLET | Refills: 0 | Status: SHIPPED | OUTPATIENT
Start: 2023-01-26

## 2023-01-26 RX ORDER — LEVOTHYROXINE SODIUM 0.1 MG/1
100 TABLET ORAL DAILY
Qty: 90 TABLET | Refills: 0 | Status: SHIPPED | OUTPATIENT
Start: 2023-01-26

## 2023-01-26 RX ORDER — AZITHROMYCIN 250 MG/1
TABLET, FILM COATED ORAL
Qty: 6 TABLET | Refills: 0 | Status: SHIPPED | OUTPATIENT
Start: 2023-01-26 | End: 2023-01-31

## 2023-01-26 NOTE — PROGRESS NOTES
BMI Counseling: Body mass index is 52 25 kg/m²  The BMI is above normal  Nutrition recommendations include decreasing portion sizes and moderation in carbohydrate intake  Exercise recommendations include exercising 3-5 times per week  No pharmacotherapy was ordered  Rationale for BMI follow-up plan is due to patient being overweight or obese  Depression Screening and Follow-up Plan: Patient was screened for depression during today's encounter  They screened negative with a PHQ-2 score of 0  Assessment/Plan:     Diagnoses and all orders for this visit:    Hypothyroidism, unspecified type  Comments:  Proceed with labs  Continue levothyroxine 100 mcg  Orders:  -     TSH, 3rd generation  -     T4  -     T3  -     levothyroxine 100 mcg tablet; Take 1 tablet (100 mcg total) by mouth daily    Mixed hyperlipidemia  Comments:  Continue statin therapy and low-fat diet  Check labs  Orders:  -     Comprehensive metabolic panel  -     Lipid panel  -     lovastatin (MEVACOR) 20 mg tablet; Take 1 tablet (20 mg total) by mouth daily at bedtime    Acute maxillary sinusitis, recurrence not specified  Comments:  DO azithromycin ordered  Orders:  -     azithromycin (ZITHROMAX) 250 mg tablet; Take 2 tablets today then 1 tablet daily x 4 days    Bronchitis  -     azithromycin (ZITHROMAX) 250 mg tablet; Take 2 tablets today then 1 tablet daily x 4 days    Mixed hyperlipidemia  Comments:  Lipids n  Orders:  -     Comprehensive metabolic panel  -     Lipid panel  -     lovastatin (MEVACOR) 20 mg tablet; Take 1 tablet (20 mg total) by mouth daily at bedtime          Subjective:      Patient ID: Imelda Asif is a 62 y o  female  Presents in the office with upper respiratory symptoms for over 3 weeks  States she now has left maxillary nearness  She continues with nasal congestion  Ears are crackling  She does cough in the a m  and p m   no Fever    Been taking over-the-counter cold preps with no relief      The following portions of the patient's history were reviewed and updated as appropriate:   She   Patient Active Problem List    Diagnosis Date Noted   • Dyspepsia 03/29/2022   • Lymphedema of both lower extremities 06/11/2020   • Bilateral hip pain 04/19/2019   • Muscle cramps 04/19/2019   • Chronic pain of multiple joints 09/21/2018   • Morbid obesity ([address] ) 05/19/2016   • Multiple joint pain 05/19/2016   • Hyperlipidemia 05/07/2013   • Hypothyroidism 05/07/2013   • Vitamin D deficiency 05/07/2013     Current Outpatient Medications   Medication Sig Dispense Refill   • azithromycin (ZITHROMAX) 250 mg tablet Take 2 tablets today then 1 tablet daily x 4 days 6 tablet 0   • calcium polycarbophil (FIBERCON) 625 mg tablet Take 625 mg by mouth daily As needed      • cholecalciferol (VITAMIN D3) 1,000 units tablet Take by mouth     • Coenzyme Q10 (CO Q 10) 100 MG CAPS Take by mouth     • levothyroxine 100 mcg tablet Take 1 tablet (100 mcg total) by mouth daily 90 tablet 0   • lovastatin (MEVACOR) 20 mg tablet Take 1 tablet (20 mg total) by mouth daily at bedtime 90 tablet 0   • MULTIPLE VITAMIN-FOLIC ACID PO Take by mouth     • pantoprazole (PROTONIX) 40 mg tablet Take 1 tablet (40 mg total) by mouth 2 (two) times a day 180 tablet 2   • famotidine (PEPCID) 20 mg tablet Take 1 tablet (20 mg total) by mouth 2 (two) times a day (Patient not taking: Reported on 1/26/2023) 30 tablet 0   • Magnesium 100 MG CAPS Take by mouth (Patient not taking: Reported on 1/26/2023)     • ondansetron (Zofran ODT) 4 mg disintegrating tablet Take 1 tablet (4 mg total) by mouth every 6 (six) hours as needed for nausea or vomiting (Patient not taking: Reported on 1/26/2023) 20 tablet 0     No current facility-administered medications for this visit  She is allergic to penicillins       Review of Systems   Constitutional: Negative for activity change, appetite change, chills, fatigue and fever  HENT: Positive for congestion, sinus pressure and sinus pain  Negative for ear pain, postnasal drip, rhinorrhea and sore throat  Respiratory: Positive for cough  Objective:        Physical Exam  Vitals and nursing note reviewed  Constitutional:       General: She is not in acute distress  Appearance: She is obese  She is not diaphoretic  HENT:      Head: Normocephalic and atraumatic  Right Ear: Tympanic membrane, ear canal and external ear normal       Left Ear: Tympanic membrane, ear canal and external ear normal       Nose:      Right Sinus: No maxillary sinus tenderness or frontal sinus tenderness  Left Sinus: Maxillary sinus tenderness present  No frontal sinus tenderness  Mouth/Throat:      Pharynx: No oropharyngeal exudate or posterior oropharyngeal erythema  Eyes:      Conjunctiva/sclera: Conjunctivae normal       Pupils: Pupils are equal, round, and reactive to light  Neck:      Thyroid: No thyromegaly  Vascular: No carotid bruit  Cardiovascular:      Rate and Rhythm: Normal rate and regular rhythm  Heart sounds: No murmur heard  No friction rub  No gallop  Pulmonary:      Effort: Pulmonary effort is normal  No respiratory distress  Breath sounds: No wheezing  Comments: No  Egophony  Expiratory   Rhonchi    RUL  Musculoskeletal:      Right lower leg: No edema  Left lower leg: No edema  Lymphadenopathy:      Cervical: No cervical adenopathy  Skin:     General: Skin is warm and dry  Findings: No erythema or rash  Neurological:      Mental Status: She is alert and oriented to person, place, and time  Psychiatric:         Behavior: Behavior normal          Thought Content:  Thought content normal          Judgment: Judgment normal

## 2023-01-30 ENCOUNTER — TELEPHONE (OUTPATIENT)
Dept: FAMILY MEDICINE CLINIC | Facility: CLINIC | Age: 58
End: 2023-01-30

## 2023-01-30 DIAGNOSIS — J40 BRONCHITIS: ICD-10-CM

## 2023-01-30 DIAGNOSIS — J01.00 ACUTE MAXILLARY SINUSITIS, RECURRENCE NOT SPECIFIED: Primary | ICD-10-CM

## 2023-01-30 RX ORDER — LEVOFLOXACIN 500 MG/1
500 TABLET, FILM COATED ORAL EVERY 24 HOURS
Qty: 10 TABLET | Refills: 0 | Status: SHIPPED | OUTPATIENT
Start: 2023-01-30 | End: 2023-02-09

## 2023-01-30 NOTE — TELEPHONE ENCOUNTER
Patient was in last Thursday and was given antibiotics  Randall Dickson told her to call back if she still wasn't feeling well  Patient said antibiotics have not helped

## 2023-01-30 NOTE — TELEPHONE ENCOUNTER
Spoke  With  Patient  She  Is  on  Hr   Last  Day  Of  Azithromycin  Still  Sick  No  Improvement  Stop  z  Pack    Change  To  levaquin

## 2023-02-20 ENCOUNTER — ANESTHESIA EVENT (OUTPATIENT)
Dept: GASTROENTEROLOGY | Facility: HOSPITAL | Age: 58
End: 2023-02-20

## 2023-02-20 ENCOUNTER — HOSPITAL ENCOUNTER (OUTPATIENT)
Dept: GASTROENTEROLOGY | Facility: HOSPITAL | Age: 58
Setting detail: OUTPATIENT SURGERY
Discharge: HOME | End: 2023-02-20
Attending: INTERNAL MEDICINE

## 2023-02-20 ENCOUNTER — ANESTHESIA (OUTPATIENT)
Dept: GASTROENTEROLOGY | Facility: HOSPITAL | Age: 58
End: 2023-02-20

## 2023-02-20 VITALS
DIASTOLIC BLOOD PRESSURE: 75 MMHG | BODY MASS INDEX: 48.82 KG/M2 | WEIGHT: 293 LBS | RESPIRATION RATE: 16 BRPM | HEART RATE: 87 BPM | HEIGHT: 65 IN | TEMPERATURE: 97.6 F | SYSTOLIC BLOOD PRESSURE: 121 MMHG | OXYGEN SATURATION: 98 %

## 2023-02-20 DIAGNOSIS — Z12.11 COLON CANCER SCREENING: ICD-10-CM

## 2023-02-20 RX ORDER — PROPOFOL 10 MG/ML
INJECTION, EMULSION INTRAVENOUS CONTINUOUS PRN
Status: DISCONTINUED | OUTPATIENT
Start: 2023-02-20 | End: 2023-02-20

## 2023-02-20 RX ORDER — PROPOFOL 10 MG/ML
INJECTION, EMULSION INTRAVENOUS AS NEEDED
Status: DISCONTINUED | OUTPATIENT
Start: 2023-02-20 | End: 2023-02-20

## 2023-02-20 RX ORDER — SODIUM CHLORIDE, SODIUM LACTATE, POTASSIUM CHLORIDE, CALCIUM CHLORIDE 600; 310; 30; 20 MG/100ML; MG/100ML; MG/100ML; MG/100ML
INJECTION, SOLUTION INTRAVENOUS CONTINUOUS PRN
Status: DISCONTINUED | OUTPATIENT
Start: 2023-02-20 | End: 2023-02-20

## 2023-02-20 RX ORDER — LIDOCAINE HYDROCHLORIDE 10 MG/ML
INJECTION, SOLUTION EPIDURAL; INFILTRATION; INTRACAUDAL; PERINEURAL AS NEEDED
Status: DISCONTINUED | OUTPATIENT
Start: 2023-02-20 | End: 2023-02-20

## 2023-02-20 RX ADMIN — PROPOFOL 100 MG: 10 INJECTION, EMULSION INTRAVENOUS at 12:20

## 2023-02-20 RX ADMIN — LIDOCAINE HYDROCHLORIDE 100 MG: 10 INJECTION, SOLUTION EPIDURAL; INFILTRATION; INTRACAUDAL at 12:20

## 2023-02-20 RX ADMIN — SODIUM CHLORIDE, SODIUM LACTATE, POTASSIUM CHLORIDE, AND CALCIUM CHLORIDE: .6; .31; .03; .02 INJECTION, SOLUTION INTRAVENOUS at 12:14

## 2023-02-20 RX ADMIN — PROPOFOL 80 MCG/KG/MIN: 10 INJECTION, EMULSION INTRAVENOUS at 12:20

## 2023-02-20 NOTE — ANESTHESIA POSTPROCEDURE EVALUATION
Post-Op Assessment Note    CV Status:  Stable  Pain Score: 0    Pain management: adequate     Mental Status:  Alert and sleepy   Hydration Status:  Euvolemic   PONV Controlled:  Controlled   Airway Patency:  Patent      Post Op Vitals Reviewed: Yes      Staff: CRNA         No notable events documented      /56 (02/20/23 1246)    Temp (!) 96 6 °F (35 9 °C) (02/20/23 1246)    Pulse 93 (02/20/23 1246)   Resp 15 (02/20/23 1246)    SpO2 94 % (02/20/23 1246)

## 2023-02-20 NOTE — H&P
History and Physical -  [facility]  Elan Morgan Chinmay 62 y o  female MRN: [id number]    HPI: Jessica Pandya is a 62y o  year old female who presents with screening colonoscopy      Review of Systems    Historical Information   Past Medical History:   Diagnosis Date   • Arthritis    • Colon polyp    • Disease of thyroid gland    • Hypercholesterolemia    • Hypothyroidism      Past Surgical History:   Procedure Laterality Date   • COLONOSCOPY     • DILATION AND CURETTAGE OF UTERUS     • ENDOMETRIAL BIOPSY      BY SUCTION     Social History   Social History     Substance and Sexual Activity   Alcohol Use Yes    Comment: SOCIAL     Social History     Substance and Sexual Activity   Drug Use No     Social History     Tobacco Use   Smoking Status Never   Smokeless Tobacco Never     Family History   Problem Relation Age of Onset   • Breast cancer Mother 62   • Hypertension Father    • Colon cancer Sister 50   • Heart disease Sister    • Hypertension Sister    • Leukemia Sister 37   • Cervical cancer Sister 28   • Diabetes Maternal Grandmother    • No Known Problems Maternal Grandfather    • No Known Problems Paternal Grandmother    • No Known Problems Paternal Grandfather    • Breast cancer Maternal Aunt 66   • Arthritis Family    • Leukemia Family    • Lung cancer Family    • Other Family         SKIN DISORDER   • Thyroid disease Family    • Uterine cancer Family        Meds/Allergies     (Not in a hospital admission)      Allergies   Allergen Reactions   • Penicillins Hives       Objective     BP (!) 185/89   Pulse 92   Temp (!) 96 9 °F (36 1 °C) (Temporal)   Resp 20   Ht 5' 5" (1 651 m)   Wt (!) 142 kg (314 lb)   LMP 10/25/2022   SpO2 96%   BMI 52 25 kg/m²       PHYSICAL EXAM    Gen: NAD  CV: RRR  CHEST: Clear  ABD: soft, NT/ND  EXT: no edema  Neuro: AAO      ASSESSMENT/PLAN:  This is a 62y o  year old female here for screening colonoscopy  PLAN:   Procedure: colonoscopy

## 2023-03-29 ENCOUNTER — APPOINTMENT (OUTPATIENT)
Dept: LAB | Facility: MEDICAL CENTER | Age: 58
End: 2023-03-29

## 2023-03-29 LAB
ALBUMIN SERPL BCP-MCNC: 3.8 G/DL (ref 3.5–5)
ALP SERPL-CCNC: 73 U/L (ref 46–116)
ALT SERPL W P-5'-P-CCNC: 27 U/L (ref 12–78)
ANION GAP SERPL CALCULATED.3IONS-SCNC: 5 MMOL/L (ref 4–13)
AST SERPL W P-5'-P-CCNC: 17 U/L (ref 5–45)
BILIRUB SERPL-MCNC: 0.47 MG/DL (ref 0.2–1)
BUN SERPL-MCNC: 11 MG/DL (ref 5–25)
CALCIUM SERPL-MCNC: 9.2 MG/DL (ref 8.3–10.1)
CHLORIDE SERPL-SCNC: 106 MMOL/L (ref 96–108)
CHOLEST SERPL-MCNC: 179 MG/DL
CO2 SERPL-SCNC: 27 MMOL/L (ref 21–32)
CREAT SERPL-MCNC: 0.7 MG/DL (ref 0.6–1.3)
GFR SERPL CREATININE-BSD FRML MDRD: 96 ML/MIN/1.73SQ M
GLUCOSE P FAST SERPL-MCNC: 87 MG/DL (ref 65–99)
HDLC SERPL-MCNC: 50 MG/DL
LDLC SERPL CALC-MCNC: 102 MG/DL (ref 0–100)
NONHDLC SERPL-MCNC: 129 MG/DL
POTASSIUM SERPL-SCNC: 4.2 MMOL/L (ref 3.5–5.3)
PROT SERPL-MCNC: 7.7 G/DL (ref 6.4–8.4)
SODIUM SERPL-SCNC: 138 MMOL/L (ref 135–147)
T3 SERPL-MCNC: 1.2 NG/ML (ref 0.6–1.8)
T4 SERPL-MCNC: 11.9 UG/DL (ref 4.7–13.3)
TRIGL SERPL-MCNC: 135 MG/DL
TSH SERPL DL<=0.05 MIU/L-ACNC: 1.04 UIU/ML (ref 0.45–4.5)

## 2023-04-29 DIAGNOSIS — E78.2 MIXED HYPERLIPIDEMIA: ICD-10-CM

## 2023-04-29 DIAGNOSIS — E03.9 HYPOTHYROIDISM, UNSPECIFIED TYPE: ICD-10-CM

## 2023-05-01 RX ORDER — LOVASTATIN 20 MG/1
TABLET ORAL
Qty: 90 TABLET | Refills: 0 | Status: SHIPPED | OUTPATIENT
Start: 2023-05-01

## 2023-05-01 RX ORDER — LEVOTHYROXINE SODIUM 0.1 MG/1
TABLET ORAL
Qty: 90 TABLET | Refills: 0 | Status: SHIPPED | OUTPATIENT
Start: 2023-05-01

## 2023-05-25 ENCOUNTER — HOSPITAL ENCOUNTER (OUTPATIENT)
Dept: RADIOLOGY | Facility: MEDICAL CENTER | Age: 58
Discharge: HOME | End: 2023-05-25

## 2023-05-25 VITALS — BODY MASS INDEX: 48.82 KG/M2 | WEIGHT: 293 LBS | HEIGHT: 65 IN

## 2023-05-25 DIAGNOSIS — Z12.31 ENCOUNTER FOR SCREENING MAMMOGRAM FOR MALIGNANT NEOPLASM OF BREAST: ICD-10-CM

## 2023-07-27 NOTE — PROGRESS NOTES
Subjective:        Natalie Orantes is a 62 y.o. female. Here for Gynecologic Exam      GYN HPI   Here for annual gyn exam  Menstrual cycle:pt is perimenopausal- last period 10/22- nearing a year  Vaginal c/o: denies  Urinary c/o: denies  Breast complaints:denies  She does not do self breast Exams    Does get some dietary calcium intake. Does take vit D supplements    Sexually active: not often- is  though  Contraception: none  She reports she feels safe at home. The following portions of the patient's history were reviewed and updated as appropriate: allergies, current medications, past family history, past medical history, past social history, past surgical history, and problem list.    Hereditary Cancer Screening  Cancer-related family history includes Breast cancer (age of onset: 62) in her mother; Breast cancer (age of onset: 66) in her maternal aunt; Cervical cancer (age of onset: 28) in her sister; Colon cancer (age of onset: 50) in her sister; Lung cancer in her family; Uterine cancer in her family. Substance Abuse Screening Completed. See hx and flowsheet. Screens Positive for none          HEALTH MAINTENANCE SCREENINGS:    History of abnormal pap: No, Last Papanicolaou test:  2020  History of abnormal mammogram: No, Last mammogram:  2023  Last Colon Cancer Screenin2023 Not on file Not on file      Review of Systems   Constitutional: Negative for appetite change, chills, fatigue, fever and unexpected weight change. HENT: Negative. Eyes: Negative. Respiratory: Negative for chest tightness and shortness of breath. Cardiovascular: Negative for chest pain and palpitations. Gastrointestinal: Negative for abdominal pain, constipation and vomiting. Endocrine: Negative for cold intolerance and heat intolerance. Genitourinary:        As per HPI   Musculoskeletal: Negative for back pain, joint swelling and neck pain. Skin: Negative for color change and rash. Neurological: Negative for dizziness, weakness and numbness. Hematological: Does not bruise/bleed easily. Psychiatric/Behavioral: Negative. Objective:  /84 (BP Location: Left arm, Patient Position: Sitting, Cuff Size: Extra-Large)   Ht 5' 5" (1.651 m)   Wt (!) 141 kg (310 lb)   LMP 10/01/2022 (Approximate)   BMI 51.59 kg/m²        Physical Exam  Constitutional:       General: She is not in acute distress. Appearance: Normal appearance. She is obese. Genitourinary:      Vulva and rectum normal.      No lesions in the vagina. Right Labia: No rash or lesions. Left Labia: No lesions or rash. No vaginal discharge, erythema, tenderness or bleeding. Mild vaginal atrophy present. Right Adnexa: not tender and no mass present. Left Adnexa: not tender and no mass present. No cervical motion tenderness, discharge or friability. Uterus is not enlarged or tender. No urethral prolapse present. Pelvic exam was performed with patient in the lithotomy position. Breasts:     Breasts are symmetrical.      Right: No inverted nipple, mass, nipple discharge, skin change or tenderness. Left: No inverted nipple, mass, nipple discharge, skin change or tenderness. HENT:      Head: Normocephalic and atraumatic. Cardiovascular:      Rate and Rhythm: Normal rate. Heart sounds: No murmur heard. Pulmonary:      Effort: Pulmonary effort is normal.      Breath sounds: Normal breath sounds. Abdominal:      General: There is no distension. Palpations: Abdomen is soft. Tenderness: There is no abdominal tenderness. Musculoskeletal:         General: Normal range of motion. Cervical back: Normal range of motion. Lymphadenopathy:      Cervical: No cervical adenopathy. Neurological:      Mental Status: She is alert and oriented to person, place, and time. Skin:     General: Skin is warm and dry.    Psychiatric:         Mood and Affect: Mood normal.         Behavior: Behavior normal.   Vitals reviewed. Assessment/Plan:           [address]- Primary  Annual GYN examination completed today. Risk prevention and anticipatory guidance provided including:  • Risk for hereditary cancers: Family history reviewed and patient does not qualify for referral for genetics consult/testing. Referral to genetics oncology offered as indicated. • Up to date on colon cancer screening  • Calcium and vitamin D supplementation. • Dietary and lifestyle recommendations based on her age and weight. body mass index is 51.59 kg/m². .    • Tobacco and alcohol use, intervention ordered if applicable. Cervical cancer screening  Previous pap smears and ASCCP screening guidelines have been reviewed. Pap smear is not indicated at this time. Contraception   N/A- post menopause    STI Screening  STI screening is declined. STI prevention discussed with use of condoms. Breast exam and breast cancer screening  Breast exam was done; , Reviewed recommendation for yearly screening mammogram (as per ACOG, ACS, and [address] Departments), however, also made her aware that there are other professional organizations that may recommend deferring mammograms until age 48 or screening every other year. CBE should still be done yearly, but may miss some cancers and alone is not as effective as breast imaging. Supplemental testing may also be indicated based on lifetime risk for breast cancer as done by [facility] imaging., She is up to date with screening;  Next due 5/2024    Problem List Items Addressed This Visit    None  Visit Diagnoses     Encounter for annual routine gynecological examination    -  Primary    Encounter for screening mammogram for breast cancer        Relevant Orders    Mammo screening bilateral w 3d & cad          Orders Placed This Encounter   Procedures   • Mammo screening bilateral w 3d & cad

## 2023-07-28 ENCOUNTER — ANNUAL EXAM (OUTPATIENT)
Dept: OBGYN CLINIC | Facility: MEDICAL CENTER | Age: 58
End: 2023-07-28
Payer: COMMERCIAL

## 2023-07-28 VITALS
SYSTOLIC BLOOD PRESSURE: 162 MMHG | HEIGHT: 65 IN | WEIGHT: 293 LBS | DIASTOLIC BLOOD PRESSURE: 84 MMHG | BODY MASS INDEX: 48.82 KG/M2

## 2023-07-28 DIAGNOSIS — Z12.31 ENCOUNTER FOR SCREENING MAMMOGRAM FOR BREAST CANCER: ICD-10-CM

## 2023-07-28 DIAGNOSIS — Z01.419 ENCOUNTER FOR ANNUAL ROUTINE GYNECOLOGICAL EXAMINATION: Primary | ICD-10-CM

## 2023-07-28 DIAGNOSIS — R13.19 ESOPHAGEAL DYSPHAGIA: ICD-10-CM

## 2023-07-28 PROCEDURE — 99396 PREV VISIT EST AGE 40-64: CPT | Performed by: CLINICAL NURSE SPECIALIST

## 2023-07-28 RX ORDER — PANTOPRAZOLE SODIUM 40 MG/1
40 TABLET, DELAYED RELEASE ORAL 2 TIMES DAILY
Qty: 180 TABLET | Refills: 0 | Status: SHIPPED | OUTPATIENT
Start: 2023-07-28

## 2023-07-28 NOTE — PROGRESS NOTES
Patient presents for a routine annual visit  Menarche- Y/O  Last Pap Smear- 20-/-  LMP- 10/2022  Mammogram- 23 bi-rads 2  Colonoscopy- 23 recall 3years     Non smoker  Social drinker  Currently sexually active  Mother with breast cancer.  Sister with colon cancer sister with cervical cancer    No concerns/questions for today's visit

## 2023-07-31 DIAGNOSIS — E78.2 MIXED HYPERLIPIDEMIA: ICD-10-CM

## 2023-07-31 DIAGNOSIS — E03.9 HYPOTHYROIDISM, UNSPECIFIED TYPE: ICD-10-CM

## 2023-07-31 RX ORDER — LEVOTHYROXINE SODIUM 0.1 MG/1
100 TABLET ORAL DAILY
Qty: 90 TABLET | Refills: 3 | OUTPATIENT
Start: 2023-07-31

## 2023-07-31 RX ORDER — LOVASTATIN 20 MG/1
20 TABLET ORAL
Qty: 90 TABLET | Refills: 3 | OUTPATIENT
Start: 2023-07-31

## 2023-08-08 ENCOUNTER — PATIENT MESSAGE (OUTPATIENT)
Dept: FAMILY MEDICINE CLINIC | Facility: CLINIC | Age: 58
End: 2023-08-08

## 2023-08-08 DIAGNOSIS — E03.9 HYPOTHYROIDISM, UNSPECIFIED TYPE: Primary | ICD-10-CM

## 2023-08-08 DIAGNOSIS — E78.2 MIXED HYPERLIPIDEMIA: ICD-10-CM

## 2023-08-09 NOTE — TELEPHONE ENCOUNTER
From: Nelly Moy  To: Lashay Crowell  Sent: 8/8/2023 1:45 PM EDT  Subject: medication refills    Will be needing refills soon. Do I need bloodwork? Do I have an order already on file with lab?  Scripts should be sent to [facility] in [facility]

## 2023-09-01 ENCOUNTER — APPOINTMENT (OUTPATIENT)
Dept: LAB | Facility: MEDICAL CENTER | Age: 58
End: 2023-09-01
Payer: COMMERCIAL

## 2023-09-01 DIAGNOSIS — E03.9 HYPOTHYROIDISM, UNSPECIFIED TYPE: ICD-10-CM

## 2023-09-01 LAB
ALBUMIN SERPL BCP-MCNC: 3.9 G/DL (ref 3.5–5)
ALP SERPL-CCNC: 66 U/L (ref 34–104)
ALT SERPL W P-5'-P-CCNC: 17 U/L (ref 7–52)
ANION GAP SERPL CALCULATED.3IONS-SCNC: 6 MMOL/L
AST SERPL W P-5'-P-CCNC: 17 U/L (ref 13–39)
BILIRUB SERPL-MCNC: 0.56 MG/DL (ref 0.2–1)
BUN SERPL-MCNC: 9 MG/DL (ref 5–25)
CALCIUM SERPL-MCNC: 9.1 MG/DL (ref 8.4–10.2)
CHLORIDE SERPL-SCNC: 102 MMOL/L (ref 96–108)
CHOLEST SERPL-MCNC: 158 MG/DL
CO2 SERPL-SCNC: 30 MMOL/L (ref 21–32)
CREAT SERPL-MCNC: 0.62 MG/DL (ref 0.6–1.3)
GFR SERPL CREATININE-BSD FRML MDRD: 100 ML/MIN/1.73SQ M
GLUCOSE P FAST SERPL-MCNC: 95 MG/DL (ref 65–99)
HDLC SERPL-MCNC: 43 MG/DL
LDLC SERPL CALC-MCNC: 91 MG/DL (ref 0–100)
NONHDLC SERPL-MCNC: 115 MG/DL
POTASSIUM SERPL-SCNC: 4.4 MMOL/L (ref 3.5–5.3)
PROT SERPL-MCNC: 7.3 G/DL (ref 6.4–8.4)
SODIUM SERPL-SCNC: 138 MMOL/L (ref 135–147)
TRIGL SERPL-MCNC: 119 MG/DL
TSH SERPL DL<=0.05 MIU/L-ACNC: 1.81 UIU/ML (ref 0.45–4.5)

## 2023-09-01 PROCEDURE — 36415 COLL VENOUS BLD VENIPUNCTURE: CPT

## 2023-09-01 PROCEDURE — 84443 ASSAY THYROID STIM HORMONE: CPT

## 2023-09-14 ENCOUNTER — OFFICE VISIT (OUTPATIENT)
Dept: FAMILY MEDICINE CLINIC | Facility: CLINIC | Age: 58
End: 2023-09-14
Payer: COMMERCIAL

## 2023-09-14 VITALS
DIASTOLIC BLOOD PRESSURE: 86 MMHG | WEIGHT: 293 LBS | HEART RATE: 86 BPM | TEMPERATURE: 97.5 F | OXYGEN SATURATION: 95 % | BODY MASS INDEX: 48.82 KG/M2 | SYSTOLIC BLOOD PRESSURE: 136 MMHG | HEIGHT: 65 IN

## 2023-09-14 DIAGNOSIS — E55.9 VITAMIN D DEFICIENCY: ICD-10-CM

## 2023-09-14 DIAGNOSIS — E78.2 MIXED HYPERLIPIDEMIA: ICD-10-CM

## 2023-09-14 DIAGNOSIS — E66.01 MORBID OBESITY (HCC): ICD-10-CM

## 2023-09-14 DIAGNOSIS — R10.13 DYSPEPSIA: ICD-10-CM

## 2023-09-14 DIAGNOSIS — E03.9 HYPOTHYROIDISM, UNSPECIFIED TYPE: Primary | ICD-10-CM

## 2023-09-14 DIAGNOSIS — E03.9 HYPOTHYROIDISM, UNSPECIFIED TYPE: ICD-10-CM

## 2023-09-14 PROCEDURE — 99214 OFFICE O/P EST MOD 30 MIN: CPT | Performed by: PHYSICIAN ASSISTANT

## 2023-09-14 RX ORDER — LEVOTHYROXINE SODIUM 0.1 MG/1
100 TABLET ORAL DAILY
Qty: 90 TABLET | Refills: 1 | Status: SHIPPED | OUTPATIENT
Start: 2023-09-14

## 2023-09-14 RX ORDER — LOVASTATIN 20 MG/1
20 TABLET ORAL
Qty: 90 TABLET | Refills: 1 | Status: SHIPPED | OUTPATIENT
Start: 2023-09-14

## 2023-09-14 NOTE — PROGRESS NOTES
Assessment/Plan:     Diagnoses and all orders for this visit:    Hypothyroidism, unspecified type  Comments:  Continue levothyroxine 100 mcg  Orders:  -     TSH, 3rd generation; Future  -     T4; Future  -     T3; Future  -     levothyroxine 100 mcg tablet; Take 1 tablet (100 mcg total) by mouth daily    Mixed hyperlipidemia  Comments:  Continue statin therapy and low-fat diet  Orders:  -     Comprehensive metabolic panel; Future  -     Lipid panel; Future  -     lovastatin (MEVACOR) 20 mg tablet; Take 1 tablet (20 mg total) by mouth daily at bedtime    Morbid obesity (HCC)    Dyspepsia  Comments: May wean off PPI and changed to famotidine  Orders:  -     CBC and differential; Future    Vitamin D deficiency    Mixed hyperlipidemia  Comments:  Lipids n  Orders:  -     Comprehensive metabolic panel; Future  -     Lipid panel; Future  -     lovastatin (MEVACOR) 20 mg tablet; Take 1 tablet (20 mg total) by mouth daily at bedtime    Hypothyroidism, unspecified type  Comments:  Proceed with labs. Continue levothyroxine 100 mcg  Orders:  -     TSH, 3rd generation; Future  -     T4; Future  -     T3; Future  -     levothyroxine 100 mcg tablet; Take 1 tablet (100 mcg total) by mouth daily          Subjective:      Patient ID: Deanna Beavers is a 62 y.o. female. Presents in the office for follow-up chronic conditions. Patient has hypothyroidism. She is on levothyroxine 100 mcg. Her current TSH is normal.  Hyperlipidemia she is on lovastatin 20 mg.  MP and lipid panel are normal.  Has a history of GERD. Patient states she is down to pantoprazole 40 mg 3 times a week. May discontinue if she would like and switch back to famotidine. States she is going to see vascular surgeon for her bilateral lower leg varicose veins and swelling.       The following portions of the patient's history were reviewed and updated as appropriate:   She   Patient Active Problem List    Diagnosis Date Noted   • Dyspepsia 03/29/2022   • Lymphedema of both lower extremities 06/11/2020   • Bilateral hip pain 04/19/2019   • Muscle cramps 04/19/2019   • Chronic pain of multiple joints 09/21/2018   • Morbid obesity ([address]) 05/19/2016   • Multiple joint pain 05/19/2016   • Hyperlipidemia 05/07/2013   • Hypothyroidism 05/07/2013   • Vitamin D deficiency 05/07/2013     Current Outpatient Medications   Medication Sig Dispense Refill   • calcium polycarbophil (FIBERCON) 625 mg tablet Take 625 mg by mouth daily As needed      • cholecalciferol (VITAMIN D3) 1,000 units tablet Take by mouth     • Coenzyme Q10 (CO Q 10) 100 MG CAPS Take by mouth     • levothyroxine 100 mcg tablet Take 1 tablet (100 mcg total) by mouth daily 90 tablet 1   • lovastatin (MEVACOR) 20 mg tablet Take 1 tablet (20 mg total) by mouth daily at bedtime 90 tablet 1   • MULTIPLE VITAMIN-FOLIC ACID PO Take by mouth     • pantoprazole (PROTONIX) 40 mg tablet TAKE 1 TABLET TWICE A DAY (Patient taking differently: 40 mg daily Taking Mon-Wed_Fri) 180 tablet 0   • Magnesium 100 MG CAPS Take by mouth (Patient not taking: Reported on 1/26/2023)       No current facility-administered medications for this visit. She is allergic to penicillins. .    Review of Systems   Constitutional: Negative for activity change and unexpected weight change. HENT: Negative for ear pain and sore throat. Eyes: Negative for visual disturbance. Respiratory: Negative for cough, shortness of breath and wheezing. Cardiovascular: Positive for leg swelling. Negative for chest pain. Varicose veins   Gastrointestinal: Negative for abdominal pain, blood in stool, constipation, diarrhea, nausea and vomiting. Genitourinary: Negative for difficulty urinating. Musculoskeletal: Negative for arthralgias and myalgias. Skin: Negative for rash. Neurological: Negative for dizziness, syncope, light-headedness and headaches. Psychiatric/Behavioral: Negative for self-injury, sleep disturbance and suicidal ideas. The patient is not nervous/anxious. Objective:        Physical Exam  Vitals and nursing note reviewed. Constitutional:       General: She is not in acute distress. Appearance: She is well-developed. She is obese. She is not diaphoretic. HENT:      Head: Normocephalic and atraumatic. Right Ear: Tympanic membrane, ear canal and external ear normal.      Left Ear: Tympanic membrane, ear canal and external ear normal.      Mouth/Throat:      Pharynx: No posterior oropharyngeal erythema. Eyes:      Conjunctiva/sclera: Conjunctivae normal.      Pupils: Pupils are equal, round, and reactive to light. Neck:      Thyroid: No thyromegaly. Vascular: No carotid bruit. Cardiovascular:      Rate and Rhythm: Normal rate and regular rhythm. Heart sounds: Normal heart sounds. No murmur heard. No friction rub. No gallop. Pulmonary:      Effort: Pulmonary effort is normal. No respiratory distress. Breath sounds: Normal breath sounds. No wheezing. Abdominal:      General: Abdomen is protuberant. Bowel sounds are normal. There is no distension. Palpations: Abdomen is soft. There is no mass. Tenderness: There is no abdominal tenderness. Musculoskeletal:      Right lower leg: Edema present. Left lower leg: Edema present. Comments: Non  Pitting   Edema  Ankles. Bilateral   varicose  Veins  Lower  leg   Lymphadenopathy:      Cervical: No cervical adenopathy. Skin:     General: Skin is warm and dry. Findings: No erythema or rash. Neurological:      General: No focal deficit present. Mental Status: She is alert and oriented to person, place, and time. Psychiatric:         Mood and Affect: Mood normal.         Behavior: Behavior normal.         Thought Content:  Thought content normal.         Judgment: Judgment normal.

## 2023-10-02 ENCOUNTER — OFFICE VISIT (OUTPATIENT)
Dept: VASCULAR SURGERY | Facility: CLINIC | Age: 58
End: 2023-10-02
Payer: COMMERCIAL

## 2023-10-02 VITALS
DIASTOLIC BLOOD PRESSURE: 84 MMHG | SYSTOLIC BLOOD PRESSURE: 142 MMHG | HEIGHT: 65 IN | BODY MASS INDEX: 48.82 KG/M2 | WEIGHT: 293 LBS | HEART RATE: 100 BPM

## 2023-10-02 DIAGNOSIS — I83.893 VARICOSE VEINS OF BOTH LEGS WITH EDEMA: ICD-10-CM

## 2023-10-02 DIAGNOSIS — R60.0 BILATERAL EDEMA OF LOWER EXTREMITY: Primary | ICD-10-CM

## 2023-10-02 PROCEDURE — 99203 OFFICE O/P NEW LOW 30 MIN: CPT | Performed by: NURSE PRACTITIONER

## 2023-10-02 NOTE — PATIENT INSTRUCTIONS
Conservative medical management with daily use of medical grade compression stockings 20-30 mmHg. On a.m., off in p.m. Frequent ambulation   Leg elevation at rest   Moisturizer and diligent skin care to maintain skin integrity and prevent skin breakdown    R medial ankle varicosity- medical sclerotherapy. Will submit for authorization.- Office to be in touch if approved.

## 2023-10-02 NOTE — PROGRESS NOTES
Assessment/Plan:    Lymphedema of both lower extremities  BLE edema - see below          Bilateral edema of lower extremity  61 yo female with obesity (BMI 50.59), HLD and chronic pain presents with c/o BLE edema and RLE discoloration with prominent dilated vein. - c/o BLE edema and discoloration R>L. Significant edema at level of ankle. Denies pain  - No drainage, weeping or wounds   - RLE venous stasis changes  - Does not wear compression  - R medial ankle, small dilated varicosity impending rupture. - palpable DP pulses bilaterally    Plan:  -Conservative medical management with daily use of medical grade compression stockings 20-30 mmHg. On a.m., off in p.m. Tubi  size F donned in office today  -Frequent ambulation, ankle exercises. Leg elevation at rest   -Moisturizer and diligent skin care to maintain skin integrity and prevent skin breakdown  -weight management  -May consider pneumatic lymph pumps in future if compression not effective. -R medial ankle varicosity- medical sclerotherapy. Will submit for authorization. Diagnoses and all orders for this visit:    Bilateral edema of lower extremity  -     Compression Stocking    Varicose veins of both legs with edema  -     Ambulatory Referral to Vascular Surgery  -     Compression Stocking          Subjective:      Patient ID: Dorina Camacho is a 62 y.o. female. New patient, presents for eval of RLE edema. Patient reports discoloration, swelling, and burning pain. Patient denies wearing compression. Patient denies tissue loss. Denies previous treatment. [address] [facility] [employer]   Phone: [phone]  Fax: [phone]   E-mail: [email or web address]    Ordering Provider:  Lj Galicia (NPI: [id number])  [facility]  [address] [place] [address] ([place]), [address] [facility]  Phone: [phone]  Fax: [phone]      Patient Information  MRN: [id number]   Jose Malik Chinmay  1965   [employer] [id number] [phone]     Insurance Information  Payor: Rachele Cm / Plan: Rachele Cm / Product Type: PPO Commercial /      [id number] - (Commercial / Managed Care)     Patient Height and Weight 5' 5" (1.651 m)    Wt Readings from Last 1 Encounters:   10/02/23 (!) 138 kg (305 lb)       Compression Lymphedema Pump Prescription Form      [x] Patient utilized Compression Garment ?  30 mmHg distally OR Gradient Wrap System    Appliance:     Legs:    [x] Right    [x] Left   Arms:    [] Right    [] Left     []Chest garment    []Abdominal garment     Length of need: 99 months    Protocol:  [x] Std: 40 mmHg, TID/ BID,  60 minutes  [] Other:   Pressures: __ mmHg    Frequency: __ / Day   Minutes/ Session: __ minutes    Patient History and Prognosis:  [x] Patient was diagnosed for the chronic disorder with reported on-set __  [x] Attempts at elevation and compression have not improved patients' condition and is now at risk of lymphatic disorder progressing to the next stage/ grade  [x] Patient's physical condition/ range of motion limited for exercise  [x] Patient/ Caregiver experienced difficulty applying 30 mmHg distal compression garments  [x] Patient compression stocking/ wrap tolerance limited due to pain/ reduced circulation  [x] Patient advised to reduce salt intake and adhere to a daily regiment of elevation, compression, and lymphatic exercises  [x] Patient's severe condition will not improve without further treatment interventions  [x] Patient has noted skin changes such as marked hyperkeratosis with hyperplasia and hyperpigmentation, papillomatosis cutis lymphostatica, elephantiasis, and/ or skin breakdown with persisting lymphorrhea    Symptoms/ Observation/ Evaluation/ Plan of Care for Lymphedema / Venous Compression Pumps     Conservative Care ? 4 weeks for severe lymphedema (check all that apply):  Patient instructions for DAILY use of conservative therapies;  [x] Elevate extremities daily and nightly to reduce swelling  [x] Exercise and ambulate / range of motion (ROM) daily to increase fluid flow and reduce swelling  [x] Wear 30-mmHg distal compression garments / wraps daily to reduce / control swelling  [x] Manual lymph drainage (MLD)  [x] On-set date of lymphedema / ulcers: 10/2/23      Initial Measurements      Body Part Right Left   Ankle / Forearm 28 cm 29 cm   Calf / Elbow  49 cm 48 cm   Knee / Bicep  Not Applicable (N/A) Not Applicable (N/A)   Mid-Thigh / Axilla  63 cm 64 cm       HPI  See assessment plan    66-year-old obese female presents with complaints of ongoing BLE edema most significant at ankle and RLE discoloration  She is not compliant with compression. She also reports right medial ankle dilated varicosity that she is concerned will rupture and bleed. Discussed pathophysiology of venous insufficiency. Recommend conservative management with daily use of medical grade compression stockings 20-30 mmHg. On a.m., off in p.m. frequent ambulation, and leg elevation at rest.  If patient cannot tolerate compression would recommend Tubigrip size F which was donned in office today. We will follow-up in 3 months after use of compression for symptom evaluation. At that time if she has not seen significant improvement, can consider pneumatic lymphedema pumps. First measurements taken today. Right medial ankle with dilated varicosity impending rupture. Discussed possible medical sclerotherapy injections. Patient denies bleeding episode today. Recommend continued conservative management as above, and we will submit for preauthorization if possible for medical sclerotherapy injections given the high likelihood of ruptured and bleeding event.     She has palpable distal pulses bilaterally  Encouraged continued weight loss      The following portions of the patient's history were reviewed and updated as appropriate: allergies, current medications, past family history, past medical history, past social history, past surgical history and problem list.    Review of Systems   Constitutional: Negative. HENT: Negative. Eyes: Negative. Respiratory: Negative. Cardiovascular: Positive for leg swelling. Gastrointestinal: Negative. Endocrine: Negative. Genitourinary: Negative. Musculoskeletal: Negative. Skin: Positive for color change. Allergic/Immunologic: Negative. Neurological: Negative. Hematological: Negative. Psychiatric/Behavioral: Negative. Objective:      /84 (BP Location: Left arm, Patient Position: Sitting, Cuff Size: Standard)   Pulse 100   Ht 5' 5" (1.651 m)   Wt (!) 138 kg (305 lb)   LMP 10/01/2022 (Approximate)   BMI 50.75 kg/m²          Physical Exam  Vitals reviewed. Constitutional:       Appearance: She is obese. HENT:      Head: Normocephalic. Cardiovascular:      Rate and Rhythm: Normal rate. Pulses: Normal pulses. Dorsalis pedis pulses are 2+ on the right side and 2+ on the left side. Comments: 2-3+ at ankle level  Pulmonary:      Effort: Pulmonary effort is normal. No respiratory distress. Musculoskeletal:         General: Normal range of motion. Right lower le+ Pitting Edema present. Left lower le+ Pitting Edema present. Skin:     General: Skin is warm. Capillary Refill: Capillary refill takes less than 2 seconds. Comments: RLE venous stasis changes  Right medial ankle dilated, purple varicosity, impending rupture. Neurological:      Mental Status: She is alert and oriented to person, place, and time. Sensory: No sensory deficit. Motor: No weakness.    Psychiatric:         Behavior: Behavior normal.           R medial ankle varicosity    Vitals:    10/02/23 1337   BP: 142/84   BP Location: Left arm   Patient Position: Sitting   Cuff Size: Standard   Pulse: 100   Weight: (!) 138 kg (305 lb)   Height: 5' 5" (1.651 m)       Patient Active Problem List   Diagnosis   • Hyperlipidemia   • Hypothyroidism   • Morbid obesity ([address])   • Multiple joint pain   • Vitamin D deficiency   • Chronic pain of multiple joints   • Bilateral hip pain   • Muscle cramps   • Lymphedema of both lower extremities   • Dyspepsia   • Bilateral edema of lower extremity   • Varicose veins of both legs with edema       Past Surgical History:   Procedure Laterality Date   • COLONOSCOPY     • DILATION AND CURETTAGE OF UTERUS     • ENDOMETRIAL BIOPSY      BY SUCTION       Family History   Problem Relation Age of Onset   • Breast cancer Mother 62   • Hypertension Father    • Colon cancer Sister 50   • Heart disease Sister    • Hypertension Sister    • Leukemia Sister 37   • Cervical cancer Sister 28   • Diabetes Maternal Grandmother    • No Known Problems Maternal Grandfather    • No Known Problems Paternal Grandmother    • No Known Problems Paternal Grandfather    • Breast cancer Maternal Aunt 66   • Arthritis Family    • Leukemia Family    • Lung cancer Family    • Other Family         SKIN DISORDER   • Thyroid disease Family    • Uterine cancer Family        Social History     Socioeconomic History   • Marital status: /Civil Union     Spouse name: Not on file   • Number of children: Not on file   • Years of education: Not on file   • Highest education level: Not on file   Occupational History   • Not on file   Tobacco Use   • Smoking status: Never     Passive exposure: Past   • Smokeless tobacco: Never   Vaping Use   • Vaping Use: Never used   Substance and Sexual Activity   • Alcohol use: Yes     Comment: SOCIAL   • Drug use: No   • Sexual activity: Yes     Partners: Male   Other Topics Concern   • Not on file   Social History Narrative    DAILY COFFEE CONSUMPTION (1 CUPS/DAY)    USES SAFETY EQUIPMENT - [employer]     Social Determinants of Health     Financial Resource Strain: Not on file   Food Insecurity: Not on file   Transportation Needs: Not on file   Physical Activity: Not on file   Stress: Not on file   Social Connections: Not on file   Intimate Partner Violence: Not on file   Housing Stability: Not on file       Allergies   Allergen Reactions   • Penicillins Hives         Current Outpatient Medications:   •  calcium polycarbophil (FIBERCON) 625 mg tablet, Take 625 mg by mouth daily As needed , Disp: , Rfl:   •  cholecalciferol (VITAMIN D3) 1,000 units tablet, Take by mouth, Disp: , Rfl:   •  Coenzyme Q10 (CO Q 10) 100 MG CAPS, Take by mouth, Disp: , Rfl:   •  levothyroxine 100 mcg tablet, Take 1 tablet (100 mcg total) by mouth daily, Disp: 90 tablet, Rfl: 1  •  lovastatin (MEVACOR) 20 mg tablet, Take 1 tablet (20 mg total) by mouth daily at bedtime, Disp: 90 tablet, Rfl: 1  •  MULTIPLE VITAMIN-FOLIC ACID PO, Take by mouth, Disp: , Rfl:   •  pantoprazole (PROTONIX) 40 mg tablet, TAKE 1 TABLET TWICE A DAY (Patient taking differently: 40 mg daily Taking Mon-Wed_Fri), Disp: 180 tablet, Rfl: 0  •  Magnesium 100 MG CAPS, Take by mouth (Patient not taking: Reported on 1/26/2023), Disp: , Rfl:

## 2023-10-02 NOTE — ASSESSMENT & PLAN NOTE
61 yo female with obesity (BMI 50.59), HLD and chronic pain presents with c/o BLE edema and RLE discoloration with prominent dilated vein. - c/o BLE edema and discoloration R>L. Significant edema at level of ankle. Denies pain  - No drainage, weeping or wounds   - RLE venous stasis changes  - Does not wear compression  - R medial ankle, small dilated varicosity impending rupture. - palpable DP pulses bilaterally    Plan:  -Conservative medical management with daily use of medical grade compression stockings 20-30 mmHg. On a.m., off in p.m. Tubi  size F donned in office today  -Frequent ambulation, ankle exercises. Leg elevation at rest   -Moisturizer and diligent skin care to maintain skin integrity and prevent skin breakdown  -weight management  -May consider pneumatic lymph pumps in future if compression not effective. -R medial ankle varicosity- medical sclerotherapy. Will submit for authorization.

## 2023-10-04 ENCOUNTER — TELEPHONE (OUTPATIENT)
Dept: VASCULAR SURGERY | Facility: CLINIC | Age: 58
End: 2023-10-04

## 2023-10-04 NOTE — TELEPHONE ENCOUNTER
Called patient to schedule - left message     From: Verona Harris <Elicia [email or web address]   Sent: Tuesday, October 3, 2023 12:41 PM  To: Viktoria Mayers <[email or web address]; Keven Oneill <[email or web address]; Anand Thomas <[email or web address]  Subject: Jossy Dela Cruz Chinmay 1965 - MRN: [id number]      Hi,     Patient has been Approved for Sclero injections. Can someone please call her to let her know that is has been approved and please get her scheduled. The date range is from 10/16/23 - 01/16/24. Gurinder Delaney #[id number]    Thank you,  Jake Conway  Prior Authorization & Referral  Vascular Center  [facility]  [address] [place] [address], [address] [place] [address]   P: [phone]  F: [phone]  [email or web address]  [email or web address]

## 2023-12-20 ENCOUNTER — CLINICAL SUPPORT (OUTPATIENT)
Dept: VASCULAR SURGERY | Facility: CLINIC | Age: 58
End: 2023-12-20
Payer: COMMERCIAL

## 2023-12-20 VITALS
WEIGHT: 293 LBS | HEIGHT: 65 IN | BODY MASS INDEX: 48.82 KG/M2 | DIASTOLIC BLOOD PRESSURE: 86 MMHG | HEART RATE: 100 BPM | SYSTOLIC BLOOD PRESSURE: 152 MMHG

## 2023-12-20 DIAGNOSIS — I83.893 VARICOSE VEINS OF BOTH LEGS WITH EDEMA: Primary | ICD-10-CM

## 2023-12-20 PROCEDURE — 36470 NJX SCLRSNT 1 INCMPTNT VEIN: CPT | Performed by: NURSE PRACTITIONER

## 2023-12-20 NOTE — PROGRESS NOTES
SCLEROTHERAPY PROCEDURE NOTE -  VASCULAR      Assessment/Plan:   A: 58-year-old female with obesity, HLD, chronic pain, and BLE edema with RLE venous stasis changes and prominent dilated vein presents for medical sclerotherapy of varicosity impending rupture.    P: Medical sclerotherapy injection to right medial ankle vein with Asclera 1% solution.       Diagnoses and all orders for this visit:    Varicose veins of both legs with edema          Subjective:      Patient ID: Keila Moy is a 58 y.o. female who presents for medial sclerotherapy injections.       Indications for Procedure: Patient presents with RLE medial ankle dilated reticular veins and telangiectasia on the legs.     Objective      Exam: On bilateral lower extremities, dilated reticular veins and telangiectasias are present symmetrically without findings of chronic venous insufficiency.             Superficial prominent veins.  Patient advised or risks of pain upon injection, redness and swelling after treatment, bruising and bleeding, necrosis/ulceration, allergy, discoloration and the likelihood that multiple treatments may be necessary and clearance may not be complete.  Affected areas on the R medial ankle were treated with intravascular injections of 2 cc of  1% Asclera using a 32G syringe per ’s protocol.    The patient tolerated the procedure well with minimal erythema and edema. Immediate pressure was applied with cotton balls secured with tape at the injection sites. Compression stockings of 20-30mm Hg were applied to the treated legs. The patient was advised to wear compression stockings during the day for the next 2-3 weeks.     Follow up in 4-6 weeks

## 2023-12-20 NOTE — PATIENT INSTRUCTIONS
Keep cotton ball pressure dressing and compression stockings in place x 36-48 hrs. (sleep with them for 2 nights) Then you may remove everything and take a shower.  Then, wear compression stockings daily.  On a.m., off in p.m. x 3 weeks  Encourage ambulation and leg elevation at rest   You may resume exercise activities in 1 week while wearing compression.    Injected veins may look darker/ black, or develop scab. No not pick    Bruising can be normal  Call if you are experiencing any pain  Return to office in 4-6 weeks for post injection follow-up

## 2023-12-20 NOTE — PROGRESS NOTES
[email or web address]   Tomasz Cedeño   Phone: [phone]  Fax: [phone]   E-mail: [email or web address]    Ordering Provider:  Luann Pugh (NPI: [id number])  [facility]  [address] [place] [address]   [address] [place] [address]  Phone: [phone]  Fax: [phone]      Patient Information  MRN: [id number]   Keila Moy  1965  [address]  [facility] [id number] [phone]     Insurance Information  Payor: [facility] / Plan: [facility] / Product Type: PPO Commercial /      [id number] - (Commercial / Managed Care)     Patient Height and Weight      Wt Readings from Last 1 Encounters:   10/02/23 (!) 138 kg (305 lb)         Post 4-week Measurements      Body Part Right Left   Ankle / Forearm 26 cm 26 cm   Calf / Elbow  48 cm 48 cm   Knee / Bicep  Not Applicable (N/A) Not Applicable (N/A)   Mid-Thigh / Axilla  64 cm 64 cm     Patient outcome after 4-weeks of conservative therapy:   [x] Patient's condition has NOT improved

## 2024-01-25 NOTE — PROGRESS NOTES
Assessment/Plan:    Varicose veins of both legs with edema  58-year-old female with obesity, HLD, chronic pain, BLE edema with RLE venous stasis changes and prominent dilated vein impending rupture now s/p medical sclerotherapy injection presents for post injection follow up.    - Patient doing well post injection. No post injection adverse events.   - R medial varicosity closed/ sclerosed  - +2 b/l ankle edema  - reports compliance with tubi  compression. Can not tolerate compression stockings.   - Not interested in moving forward with lymphedema pumps at this time.     Plan:  -No additional medical sclerotherapy injections indicated.  -Continue conservative management with daily compression, frequent ambulation, leg elevation  -Weight management  -Return to vascular office as needed.            Diagnoses and all orders for this visit:    Varicose veins of both legs with edema          Subjective:      Patient ID: Keila Moy is a 58 y.o. female.    Patient presents today for post-injection f/u visit s/p R medial ankle medial sclerotherapy injection 12/20/23. Using tubi- compression.     HPI    The following portions of the patient's history were reviewed and updated as appropriate: allergies, current medications, past family history, past medical history, past social history, past surgical history, and problem list.    Review of Systems   Constitutional: Negative.    HENT: Negative.     Eyes: Negative.    Respiratory:  Positive for shortness of breath.    Cardiovascular:  Positive for leg swelling.   Gastrointestinal: Negative.    Endocrine: Negative.    Genitourinary: Negative.    Musculoskeletal: Negative.    Skin: Negative.    Allergic/Immunologic: Negative.    Neurological: Negative.    Hematological: Negative.    Psychiatric/Behavioral: Negative.         I have reviewed and made appropriate changes to the review of systems input by the medical assistant.    Objective:      /92 (BP  "Location: Left arm, Patient Position: Sitting)   Pulse 90   Ht 5' 5\" (1.651 m)   Wt (!) 140 kg (308 lb)   BMI 51.25 kg/m²        Physical Exam  Vitals reviewed.   Constitutional:       General: She is not in acute distress.     Appearance: She is obese.   HENT:      Head: Normocephalic.   Cardiovascular:      Rate and Rhythm: Normal rate.      Pulses: Normal pulses.           Dorsalis pedis pulses are 2+ on the right side and 2+ on the left side.      Comments: 2-3+ at ankle level  Pulmonary:      Effort: Pulmonary effort is normal. No respiratory distress.   Musculoskeletal:         General: Normal range of motion.      Right lower le+ Edema present.      Left lower le+ Edema present.   Skin:     General: Skin is warm.      Capillary Refill: Capillary refill takes less than 2 seconds.      Comments: RLE venous stasis changes  Right medial ankle injected vein sclerosed   Neurological:      Mental Status: She is alert and oriented to person, place, and time.      Sensory: No sensory deficit.      Motor: No weakness.   Psychiatric:         Behavior: Behavior normal.             I have spent a total time of 20 minutes on 24 in caring for this patient including Prognosis, Risks and benefits of tx options, Instructions for management, Patient and family education, Documenting in the medical record, Reviewing / ordering tests, medicine, procedures  , and Obtaining or reviewing history  .      Vitals:    24 1353   BP: 164/92   BP Location: Left arm   Patient Position: Sitting   Pulse: 90   Weight: (!) 140 kg (308 lb)   Height: 5' 5\" (1.651 m)       Patient Active Problem List   Diagnosis    Hyperlipidemia    Hypothyroidism    Morbid obesity (HCC)    Multiple joint pain    Vitamin D deficiency    Chronic pain of multiple joints    Bilateral hip pain    Muscle cramps    Lymphedema of both lower extremities    Dyspepsia    Bilateral edema of lower extremity    Varicose veins of both legs with edema "       Past Surgical History:   Procedure Laterality Date    COLONOSCOPY      DILATION AND CURETTAGE OF UTERUS      ENDOMETRIAL BIOPSY      BY SUCTION       Family History   Problem Relation Age of Onset    Breast cancer Mother 58    Hypertension Father     Colon cancer Sister 48    Heart disease Sister     Hypertension Sister     Leukemia Sister 43    Cervical cancer Sister 35    Diabetes Maternal Grandmother     No Known Problems Maternal Grandfather     No Known Problems Paternal Grandmother     No Known Problems Paternal Grandfather     Breast cancer Maternal Aunt 78    Arthritis Family     Leukemia Family     Lung cancer Family     Other Family         SKIN DISORDER    Thyroid disease Family     Uterine cancer Family        Social History     Socioeconomic History    Marital status: /Civil Union     Spouse name: Not on file    Number of children: Not on file    Years of education: Not on file    Highest education level: Not on file   Occupational History    Not on file   Tobacco Use    Smoking status: Never     Passive exposure: Past    Smokeless tobacco: Never   Vaping Use    Vaping status: Never Used   Substance and Sexual Activity    Alcohol use: Yes     Comment: SOCIAL    Drug use: No    Sexual activity: Yes     Partners: Male   Other Topics Concern    Not on file   Social History Narrative    DAILY COFFEE CONSUMPTION (1 CUPS/DAY)    USES SAFETY EQUIPMENT - [employer]     Social Determinants of Health     Financial Resource Strain: Not on file   Food Insecurity: Not on file   Transportation Needs: Not on file   Physical Activity: Not on file   Stress: Not on file   Social Connections: Not on file   Intimate Partner Violence: Not on file   Housing Stability: Not on file       Allergies   Allergen Reactions    Penicillins Hives         Current Outpatient Medications:     calcium polycarbophil (FIBERCON) 625 mg tablet, Take 625 mg by mouth daily As needed , Disp: , Rfl:     cholecalciferol (VITAMIN D3)  1,000 units tablet, Take by mouth, Disp: , Rfl:     Coenzyme Q10 (CO Q 10) 100 MG CAPS, Take by mouth, Disp: , Rfl:     levothyroxine 100 mcg tablet, Take 1 tablet (100 mcg total) by mouth daily, Disp: 90 tablet, Rfl: 1    lovastatin (MEVACOR) 20 mg tablet, Take 1 tablet (20 mg total) by mouth daily at bedtime, Disp: 90 tablet, Rfl: 1    Magnesium 100 MG CAPS, Take by mouth, Disp: , Rfl:     MULTIPLE VITAMIN-FOLIC ACID PO, Take by mouth, Disp: , Rfl:     pantoprazole (PROTONIX) 40 mg tablet, TAKE 1 TABLET TWICE A DAY (Patient not taking: Reported on 1/29/2024), Disp: 180 tablet, Rfl: 0

## 2024-01-29 ENCOUNTER — OFFICE VISIT (OUTPATIENT)
Dept: VASCULAR SURGERY | Facility: CLINIC | Age: 59
End: 2024-01-29

## 2024-01-29 VITALS
SYSTOLIC BLOOD PRESSURE: 164 MMHG | DIASTOLIC BLOOD PRESSURE: 92 MMHG | WEIGHT: 293 LBS | HEIGHT: 65 IN | BODY MASS INDEX: 48.82 KG/M2 | HEART RATE: 90 BPM

## 2024-01-29 DIAGNOSIS — I83.893 VARICOSE VEINS OF BOTH LEGS WITH EDEMA: Primary | ICD-10-CM

## 2024-01-29 PROCEDURE — 99024 POSTOP FOLLOW-UP VISIT: CPT | Performed by: NURSE PRACTITIONER

## 2024-01-29 NOTE — PATIENT INSTRUCTIONS
No additional medical sclerotherapy injections indicated.    Continue conservative management with daily compression, frequent ambulation, leg elevation  Weight management  Return to vascular office as needed.

## 2024-01-29 NOTE — ASSESSMENT & PLAN NOTE
58-year-old female with obesity, HLD, chronic pain, BLE edema with RLE venous stasis changes and prominent dilated vein impending rupture now s/p medical sclerotherapy injection presents for post injection follow up.    - Patient doing well post injection. No post injection adverse events.   - R medial varicosity closed/ sclerosed  - +2 b/l ankle edema  - reports compliance with tubi  compression. Can not tolerate compression stockings.   - Not interested in moving forward with lymphedema pumps at this time.     Plan:  -No additional medical sclerotherapy injections indicated.  -Continue conservative management with daily compression, frequent ambulation, leg elevation  -Weight management  -Return to vascular office as needed.

## 2024-03-11 DIAGNOSIS — E03.9 HYPOTHYROIDISM, UNSPECIFIED TYPE: ICD-10-CM

## 2024-03-11 RX ORDER — LEVOTHYROXINE SODIUM 0.1 MG/1
100 TABLET ORAL DAILY
Qty: 90 TABLET | Refills: 1 | Status: SHIPPED | OUTPATIENT
Start: 2024-03-11

## 2024-03-15 ENCOUNTER — LAB (OUTPATIENT)
Dept: LAB | Facility: MEDICAL CENTER | Age: 59
End: 2024-03-15
Payer: COMMERCIAL

## 2024-03-15 DIAGNOSIS — R10.13 DYSPEPSIA: ICD-10-CM

## 2024-03-15 DIAGNOSIS — E03.9 HYPOTHYROIDISM, UNSPECIFIED TYPE: ICD-10-CM

## 2024-03-15 DIAGNOSIS — E78.2 MIXED HYPERLIPIDEMIA: ICD-10-CM

## 2024-03-15 LAB
ALBUMIN SERPL BCP-MCNC: 4.1 G/DL (ref 3.5–5)
ALP SERPL-CCNC: 63 U/L (ref 34–104)
ALT SERPL W P-5'-P-CCNC: 16 U/L (ref 7–52)
ANION GAP SERPL CALCULATED.3IONS-SCNC: 5 MMOL/L (ref 4–13)
AST SERPL W P-5'-P-CCNC: 14 U/L (ref 13–39)
BASOPHILS # BLD AUTO: 0.08 THOUSANDS/ÂΜL (ref 0–0.1)
BASOPHILS NFR BLD AUTO: 1 % (ref 0–1)
BILIRUB SERPL-MCNC: 0.57 MG/DL (ref 0.2–1)
BUN SERPL-MCNC: 13 MG/DL (ref 5–25)
CALCIUM SERPL-MCNC: 9.2 MG/DL (ref 8.4–10.2)
CHLORIDE SERPL-SCNC: 102 MMOL/L (ref 96–108)
CHOLEST SERPL-MCNC: 165 MG/DL
CO2 SERPL-SCNC: 31 MMOL/L (ref 21–32)
CREAT SERPL-MCNC: 0.63 MG/DL (ref 0.6–1.3)
EOSINOPHIL # BLD AUTO: 0.21 THOUSAND/ÂΜL (ref 0–0.61)
EOSINOPHIL NFR BLD AUTO: 3 % (ref 0–6)
ERYTHROCYTE [DISTWIDTH] IN BLOOD BY AUTOMATED COUNT: 12.8 % (ref 11.6–15.1)
GFR SERPL CREATININE-BSD FRML MDRD: 99 ML/MIN/1.73SQ M
GLUCOSE P FAST SERPL-MCNC: 98 MG/DL (ref 65–99)
HCT VFR BLD AUTO: 44.8 % (ref 34.8–46.1)
HDLC SERPL-MCNC: 41 MG/DL
HGB BLD-MCNC: 14.6 G/DL (ref 11.5–15.4)
IMM GRANULOCYTES # BLD AUTO: 0.02 THOUSAND/UL (ref 0–0.2)
IMM GRANULOCYTES NFR BLD AUTO: 0 % (ref 0–2)
LDLC SERPL CALC-MCNC: 88 MG/DL (ref 0–100)
LYMPHOCYTES # BLD AUTO: 2.49 THOUSANDS/ÂΜL (ref 0.6–4.47)
LYMPHOCYTES NFR BLD AUTO: 35 % (ref 14–44)
MCH RBC QN AUTO: 31.6 PG (ref 26.8–34.3)
MCHC RBC AUTO-ENTMCNC: 32.6 G/DL (ref 31.4–37.4)
MCV RBC AUTO: 97 FL (ref 82–98)
MONOCYTES # BLD AUTO: 0.73 THOUSAND/ÂΜL (ref 0.17–1.22)
MONOCYTES NFR BLD AUTO: 10 % (ref 4–12)
NEUTROPHILS # BLD AUTO: 3.61 THOUSANDS/ÂΜL (ref 1.85–7.62)
NEUTS SEG NFR BLD AUTO: 51 % (ref 43–75)
NONHDLC SERPL-MCNC: 124 MG/DL
NRBC BLD AUTO-RTO: 0 /100 WBCS
PLATELET # BLD AUTO: 260 THOUSANDS/UL (ref 149–390)
PMV BLD AUTO: 9.7 FL (ref 8.9–12.7)
POTASSIUM SERPL-SCNC: 4.4 MMOL/L (ref 3.5–5.3)
PROT SERPL-MCNC: 7.3 G/DL (ref 6.4–8.4)
RBC # BLD AUTO: 4.62 MILLION/UL (ref 3.81–5.12)
SODIUM SERPL-SCNC: 138 MMOL/L (ref 135–147)
T3 SERPL-MCNC: 1.4 NG/ML
T4 SERPL-MCNC: 9.47 UG/DL (ref 6.09–12.23)
TRIGL SERPL-MCNC: 178 MG/DL
TSH SERPL DL<=0.05 MIU/L-ACNC: 1.69 UIU/ML (ref 0.45–4.5)
WBC # BLD AUTO: 7.14 THOUSAND/UL (ref 4.31–10.16)

## 2024-03-15 PROCEDURE — 80061 LIPID PANEL: CPT

## 2024-03-15 PROCEDURE — 84480 ASSAY TRIIODOTHYRONINE (T3): CPT

## 2024-03-15 PROCEDURE — 84443 ASSAY THYROID STIM HORMONE: CPT

## 2024-03-15 PROCEDURE — 84436 ASSAY OF TOTAL THYROXINE: CPT

## 2024-03-15 PROCEDURE — 80053 COMPREHEN METABOLIC PANEL: CPT

## 2024-03-15 PROCEDURE — 36415 COLL VENOUS BLD VENIPUNCTURE: CPT

## 2024-03-15 PROCEDURE — 85025 COMPLETE CBC W/AUTO DIFF WBC: CPT

## 2024-03-21 ENCOUNTER — OFFICE VISIT (OUTPATIENT)
Dept: FAMILY MEDICINE CLINIC | Facility: CLINIC | Age: 59
End: 2024-03-21
Payer: COMMERCIAL

## 2024-03-21 VITALS
WEIGHT: 293 LBS | OXYGEN SATURATION: 95 % | SYSTOLIC BLOOD PRESSURE: 156 MMHG | HEIGHT: 65 IN | TEMPERATURE: 97.5 F | DIASTOLIC BLOOD PRESSURE: 82 MMHG | BODY MASS INDEX: 48.82 KG/M2 | HEART RATE: 95 BPM

## 2024-03-21 DIAGNOSIS — E78.2 MIXED HYPERLIPIDEMIA: ICD-10-CM

## 2024-03-21 DIAGNOSIS — E66.01 MORBID OBESITY (HCC): ICD-10-CM

## 2024-03-21 DIAGNOSIS — E03.9 HYPOTHYROIDISM, UNSPECIFIED TYPE: Primary | ICD-10-CM

## 2024-03-21 DIAGNOSIS — I89.0 LYMPHEDEMA OF BOTH LOWER EXTREMITIES: ICD-10-CM

## 2024-03-21 DIAGNOSIS — R10.13 DYSPEPSIA: ICD-10-CM

## 2024-03-21 PROCEDURE — 99214 OFFICE O/P EST MOD 30 MIN: CPT | Performed by: PHYSICIAN ASSISTANT

## 2024-03-21 RX ORDER — LOVASTATIN 20 MG/1
20 TABLET ORAL
Qty: 90 TABLET | Refills: 1 | Status: SHIPPED | OUTPATIENT
Start: 2024-03-21

## 2024-03-21 NOTE — PROGRESS NOTES
Assessment/Plan:     Diagnoses and all orders for this visit:    Hypothyroidism, unspecified type  Comments:  TSH in normal range continue levothyroxine 100 mcg  Orders:  -     TSH, 3rd generation; Future    Mixed hyperlipidemia  Comments:  Continue statin therapy and low-fat diet  Orders:  -     lovastatin (MEVACOR) 20 mg tablet; Take 1 tablet (20 mg total) by mouth daily at bedtime  -     Comprehensive metabolic panel; Future  -     Lipid panel; Future    Morbid obesity (HCC)  Comments:  Exercise and weight loss encourage    Lymphedema of both lower extremities    Dyspepsia  Comments:  Has resolved    Mixed hyperlipidemia  Comments:  Lipids n  Orders:  -     lovastatin (MEVACOR) 20 mg tablet; Take 1 tablet (20 mg total) by mouth daily at bedtime  -     Comprehensive metabolic panel; Future  -     Lipid panel; Future          Subjective:      Patient ID: Keila Moy is a 58 y.o. female.    Patient presents in the office for follow-up chronic conditions.  She has hypothyroidism she is on levothyroxine 100 mcg.  Current TSH T3 and T4 are in the normal range we will continue the same dose.  Also has hyperlipidemia she is currently on lovastatin 20 mg.  CBC and CMP are normal.  Lipid panel is good except for mildly elevated triglycerides at 178.  Adela low-carb low sugar diet and exercise.  States her GERD is stable she is off PPI therapy.        The following portions of the patient's history were reviewed and updated as appropriate: She   Patient Active Problem List    Diagnosis Date Noted    Bilateral edema of lower extremity 10/02/2023    Varicose veins of both legs with edema 10/02/2023    Dyspepsia 03/29/2022    Lymphedema of both lower extremities 06/11/2020    Bilateral hip pain 04/19/2019    Muscle cramps 04/19/2019    Chronic pain of multiple joints 09/21/2018    Morbid obesity (HCC) 05/19/2016    Multiple joint pain 05/19/2016    Hyperlipidemia 05/07/2013    Hypothyroidism 05/07/2013    Vitamin D  deficiency 05/07/2013     Current Outpatient Medications   Medication Sig Dispense Refill    calcium polycarbophil (FIBERCON) 625 mg tablet Take 625 mg by mouth daily As needed       cholecalciferol (VITAMIN D3) 1,000 units tablet Take by mouth      Coenzyme Q10 (CO Q 10) 100 MG CAPS Take by mouth      levothyroxine 100 mcg tablet TAKE ONE TABLET BY MOUTH EVERY DAY 90 tablet 1    lovastatin (MEVACOR) 20 mg tablet Take 1 tablet (20 mg total) by mouth daily at bedtime 90 tablet 1    Magnesium 100 MG CAPS Take by mouth      MULTIPLE VITAMIN-FOLIC ACID PO Take by mouth       No current facility-administered medications for this visit.     She is allergic to penicillins..    Review of Systems   Constitutional:  Negative for activity change and unexpected weight change.   HENT:  Negative for ear pain and sore throat.    Eyes:  Negative for visual disturbance.   Respiratory:  Negative for cough, shortness of breath and wheezing.    Cardiovascular:  Positive for leg swelling. Negative for chest pain.   Gastrointestinal:  Negative for abdominal pain, blood in stool, constipation, diarrhea, nausea and vomiting.   Genitourinary:  Negative for difficulty urinating.   Musculoskeletal:  Negative for arthralgias and myalgias.   Skin:  Negative for rash.   Neurological:  Negative for dizziness, syncope, light-headedness and headaches.   Psychiatric/Behavioral:  Negative for self-injury, sleep disturbance and suicidal ideas. The patient is not nervous/anxious.          Objective:        Physical Exam  Vitals and nursing note reviewed.   Constitutional:       General: She is not in acute distress.     Appearance: Normal appearance. She is well-developed. She is obese. She is not diaphoretic.   HENT:      Head: Normocephalic and atraumatic.      Right Ear: Tympanic membrane, ear canal and external ear normal.      Left Ear: Tympanic membrane, ear canal and external ear normal.      Mouth/Throat:      Pharynx: No posterior  oropharyngeal erythema.   Eyes:      Conjunctiva/sclera: Conjunctivae normal.      Pupils: Pupils are equal, round, and reactive to light.   Neck:      Thyroid: No thyromegaly.      Vascular: No carotid bruit.   Cardiovascular:      Rate and Rhythm: Normal rate and regular rhythm.      Heart sounds: Normal heart sounds. No murmur heard.     No friction rub. No gallop.   Pulmonary:      Effort: Pulmonary effort is normal. No respiratory distress.      Breath sounds: Normal breath sounds. No wheezing.   Abdominal:      General: Bowel sounds are normal. There is no distension.      Palpations: Abdomen is soft. There is no mass.      Tenderness: There is no abdominal tenderness.   Musculoskeletal:      Right lower leg: Edema (bilateral  ankle  edema) present.      Left lower leg: Edema present.      Comments: Carpal   tunnel symptoms   worse    right    than  left.   Also thumb  pain  bilaterally   Lymphadenopathy:      Cervical: No cervical adenopathy.   Skin:     General: Skin is warm and dry.      Findings: No erythema or rash.   Neurological:      General: No focal deficit present.      Mental Status: She is alert and oriented to person, place, and time.   Psychiatric:         Mood and Affect: Mood normal.         Behavior: Behavior normal.         Thought Content: Thought content normal.         Judgment: Judgment normal.

## 2024-06-07 ENCOUNTER — HOSPITAL ENCOUNTER (OUTPATIENT)
Dept: RADIOLOGY | Facility: MEDICAL CENTER | Age: 59
Discharge: HOME | End: 2024-06-07
Payer: COMMERCIAL

## 2024-06-07 VITALS — BODY MASS INDEX: 48.82 KG/M2 | WEIGHT: 293 LBS | HEIGHT: 65 IN

## 2024-06-07 DIAGNOSIS — Z12.31 ENCOUNTER FOR SCREENING MAMMOGRAM FOR BREAST CANCER: ICD-10-CM

## 2024-06-07 PROCEDURE — 77067 SCR MAMMO BI INCL CAD: CPT

## 2024-06-07 PROCEDURE — 77063 BREAST TOMOSYNTHESIS BI: CPT

## 2024-08-21 ENCOUNTER — OFFICE VISIT (OUTPATIENT)
Dept: FAMILY MEDICINE CLINIC | Facility: CLINIC | Age: 59
End: 2024-08-21
Payer: COMMERCIAL

## 2024-08-21 VITALS
DIASTOLIC BLOOD PRESSURE: 80 MMHG | HEART RATE: 102 BPM | BODY MASS INDEX: 48.82 KG/M2 | TEMPERATURE: 97.4 F | SYSTOLIC BLOOD PRESSURE: 148 MMHG | WEIGHT: 293 LBS | OXYGEN SATURATION: 96 % | HEIGHT: 65 IN

## 2024-08-21 DIAGNOSIS — J01.00 ACUTE MAXILLARY SINUSITIS, RECURRENCE NOT SPECIFIED: Primary | ICD-10-CM

## 2024-08-21 DIAGNOSIS — J40 BRONCHITIS: ICD-10-CM

## 2024-08-21 PROCEDURE — 99213 OFFICE O/P EST LOW 20 MIN: CPT | Performed by: PHYSICIAN ASSISTANT

## 2024-08-21 RX ORDER — AZITHROMYCIN 250 MG/1
TABLET, FILM COATED ORAL
Qty: 6 TABLET | Refills: 0 | Status: SHIPPED | OUTPATIENT
Start: 2024-08-21 | End: 2024-08-26

## 2024-08-21 NOTE — PROGRESS NOTES
Assessment/Plan:     Diagnoses and all orders for this visit:    Acute maxillary sinusitis, recurrence not specified  Comments:  Continue over-the-counter supportive care  Orders:  -     azithromycin (ZITHROMAX) 250 mg tablet; Take 2 tablets today then 1 tablet daily x 4 days    Bronchitis  Comments:  Azithromycin ordered  Orders:  -     azithromycin (ZITHROMAX) 250 mg tablet; Take 2 tablets today then 1 tablet daily x 4 days    Other orders  -     TURMERIC-TART DICK PO; Take by mouth          Subjective:      Patient ID: Keila Moy is a 58 y.o. female.    Patient presents in the office with sinus pressure and headache.  Patient stated started with allergies with congestion and postnasal drip she has had this 3 weeks now.  Has a cough.  She has headache and sinus pressure.  Has tried several over-the-counter remedies such as Claritin.  Saline nasal spray and gargles.  Patient also tried Mucinex        The following portions of the patient's history were reviewed and updated as appropriate: She  has a past medical history of Arthritis, Colon polyp, Disease of thyroid gland, Hypercholesterolemia, and Hypothyroidism.  Current Outpatient Medications   Medication Sig Dispense Refill    azithromycin (ZITHROMAX) 250 mg tablet Take 2 tablets today then 1 tablet daily x 4 days 6 tablet 0    calcium polycarbophil (FIBERCON) 625 mg tablet Take 625 mg by mouth daily As needed       cholecalciferol (VITAMIN D3) 1,000 units tablet Take by mouth      Coenzyme Q10 (CO Q 10) 100 MG CAPS Take by mouth      levothyroxine 100 mcg tablet TAKE ONE TABLET BY MOUTH EVERY DAY 90 tablet 1    lovastatin (MEVACOR) 20 mg tablet Take 1 tablet (20 mg total) by mouth daily at bedtime 90 tablet 1    Magnesium 100 MG CAPS Take by mouth      MULTIPLE VITAMIN-FOLIC ACID PO Take by mouth      TURMERIC-TART CHERRY PO Take by mouth       No current facility-administered medications for this visit.     She is allergic to penicillins..    Review  of Systems   Constitutional:  Negative for activity change, appetite change, chills, fatigue and fever.   HENT:  Positive for congestion, postnasal drip, sinus pressure and sinus pain. Negative for ear pain, rhinorrhea and sore throat.    Respiratory:  Positive for cough.    Neurological:  Positive for headaches.         Objective:        Physical Exam  Vitals and nursing note reviewed.   Constitutional:       General: She is not in acute distress.     Appearance: She is obese. She is not ill-appearing.   HENT:      Head: Normocephalic and atraumatic.      Right Ear: Tympanic membrane, ear canal and external ear normal.      Left Ear: Tympanic membrane, ear canal and external ear normal.      Nose:      Right Sinus: Maxillary sinus tenderness present.      Left Sinus: Maxillary sinus tenderness present.      Mouth/Throat:      Pharynx: No posterior oropharyngeal erythema.   Eyes:      Conjunctiva/sclera: Conjunctivae normal.   Cardiovascular:      Rate and Rhythm: Normal rate and regular rhythm.      Heart sounds: Normal heart sounds. No murmur heard.  Pulmonary:      Effort: Pulmonary effort is normal. No respiratory distress.      Breath sounds: Rhonchi present. No wheezing.      Comments: Diffuse musical expiratory rhonchi.  No egophony.  Musculoskeletal:      Right lower leg: No edema.      Left lower leg: No edema.   Lymphadenopathy:      Cervical: No cervical adenopathy.   Skin:     General: Skin is warm and dry.   Neurological:      General: No focal deficit present.      Mental Status: She is oriented to person, place, and time.   Psychiatric:         Mood and Affect: Mood normal.         Behavior: Behavior normal.         Thought Content: Thought content normal.         Judgment: Judgment normal.

## 2024-08-28 ENCOUNTER — TELEPHONE (OUTPATIENT)
Age: 59
End: 2024-08-28

## 2024-08-28 DIAGNOSIS — J01.00 ACUTE MAXILLARY SINUSITIS, RECURRENCE NOT SPECIFIED: ICD-10-CM

## 2024-08-28 DIAGNOSIS — J40 BRONCHITIS: Primary | ICD-10-CM

## 2024-08-28 RX ORDER — LEVOFLOXACIN 500 MG/1
500 TABLET, FILM COATED ORAL EVERY 24 HOURS
Qty: 7 TABLET | Refills: 0 | Status: SHIPPED | OUTPATIENT
Start: 2024-08-28 | End: 2024-09-04

## 2024-08-28 NOTE — TELEPHONE ENCOUNTER
PT said the Zpack that was prescribed for her is not helping. She is still having sinus pressure and the cough has not relented much. She said she had a similar illness last year and remembers being prescribed a zpack then which also did not help. PT said she is leaving for vacation next thus 9/5. She is requesting something else sent to [id number] - [place], [place] - [facility] Email [address]     Please advise    Thank You

## 2024-08-30 DIAGNOSIS — E03.9 HYPOTHYROIDISM, UNSPECIFIED TYPE: ICD-10-CM

## 2024-08-30 RX ORDER — LEVOTHYROXINE SODIUM 100 UG/1
100 TABLET ORAL DAILY
Qty: 90 TABLET | Refills: 1 | Status: SHIPPED | OUTPATIENT
Start: 2024-08-30

## 2024-09-26 NOTE — PROGRESS NOTES
Subjective:      Keila Moy is a 58 y.o. female. Here for Gynecologic Exam (Postmenopausal /Pap 5/18/20 -/-/Mammo 6/7/24 bi-rads 2 /Colonoscopy 2/20/23 recall 3 years /Mother/ maternal aunt breast cancer /Sister colon cancer /Sister cervical cancer )      GYN HPI  Menstrual cycle:  Now menopausal  No c/o hot flashes  Vaginal c/o: denies  Urinary c/o: denies  Breast complaints:denies   She does not do self breast Exams    Sexually active:  but not often  Contraception: n/a  She reports she feels safe at home.     Dietary calcium/vit D  intake: adequate  Lifestyle: no formal exercise    HEALTH MAINTENANCE SCREENINGS:    Last Papanicolaou test:  05/18/2020   History of abnormal pap: No  Last mammogram:  06/07/2024  Last Colon Cancer Screening: colonoscopy: 02/20/2023 Cologuard:Not on file. Recall 3y    Hereditary Cancer Screening  Cancer-related family history includes Breast cancer (age of onset: 58) in her mother; Breast cancer (age of onset: 78) in her maternal aunt; Cervical cancer (age of onset: 35) in her sister; Colon cancer (age of onset: 48) in her sister; Lung cancer in her family; Uterine cancer in her family.     Substance Abuse Screening Completed. See hx and flowsheet.    The following portions of the patient's history were reviewed and updated as appropriate: allergies, current medications, past family history, past medical history, past social history, past surgical history, and problem list.  Review of Systems   Constitutional:  Negative for appetite change, chills, fatigue, fever and unexpected weight change.   HENT: Negative.     Eyes: Negative.    Respiratory:  Negative for chest tightness and shortness of breath.    Cardiovascular:  Negative for chest pain and palpitations.   Gastrointestinal:  Negative for abdominal pain, constipation and vomiting.   Endocrine: Negative for cold intolerance and heat intolerance.   Genitourinary:         As per HPI   Musculoskeletal:  Negative for  "back pain, joint swelling and neck pain.   Skin:  Negative for color change and rash.   Neurological:  Negative for dizziness, weakness and numbness.   Hematological:  Does not bruise/bleed easily.   Psychiatric/Behavioral: Negative.               Objective:  /94 (BP Location: Left arm, Patient Position: Sitting, Cuff Size: Large)   Ht 5' 5\" (1.651 m)   Wt (!) 140 kg (308 lb)   LMP 10/01/2022 (Approximate)   BMI 51.25 kg/m²        Physical Exam  Constitutional:       General: She is not in acute distress.     Appearance: Normal appearance.   Genitourinary:      Vulva and rectum normal.      No lesions in the vagina.      Right Labia: No rash or lesions.     Left Labia: No lesions or rash.     No vaginal discharge, erythema, tenderness or bleeding.        Right Adnexa: not tender and no mass present.     Left Adnexa: not tender and no mass present.     No cervical motion tenderness, discharge or friability.      Uterus is not enlarged or tender.      No urethral prolapse present.      Pelvic exam was performed with patient in the lithotomy position.   Breasts:     Breasts are symmetrical.      Right: No inverted nipple, mass, nipple discharge, skin change or tenderness.      Left: No inverted nipple, mass, nipple discharge, skin change or tenderness.   HENT:      Head: Normocephalic and atraumatic.   Cardiovascular:      Rate and Rhythm: Normal rate.      Heart sounds: No murmur heard.  Pulmonary:      Effort: Pulmonary effort is normal.      Breath sounds: Normal breath sounds.   Abdominal:      General: There is no distension.      Palpations: Abdomen is soft.      Tenderness: There is no abdominal tenderness.   Musculoskeletal:         General: Normal range of motion.      Cervical back: Normal range of motion.   Lymphadenopathy:      Cervical: No cervical adenopathy.   Neurological:      Mental Status: She is alert and oriented to person, place, and time.   Skin:     General: Skin is warm and dry. "   Psychiatric:         Mood and Affect: Mood normal.         Behavior: Behavior normal.   Vitals reviewed.             Assessment/Plan:           ANNUAL GYN EXAM- Primary  Annual GYN examination completed today.   Health maintenance reviewed/updated as appropriate  Cervical cancer screen: Previous pap smears and ASCCP screening guidelines have been reviewed. Pap not indicated today-due next year.  Breast Health: Encouraged regular self breast exams. Mammo ordered.  Colon cancer screening: due in 2 yrs    Risk prevention and anticipatory guidance provided including:  Age related Calcium and vitamin D intake  Dietary and lifestyle recommendations based on her age and weight. body mass index is 51.25 kg/m²..    Tobacco and alcohol use, intervention ordered if applicable.   Condom use for prevention of STI's.  Contraception:  N/A- post menopause    Problem List Items Addressed This Visit    None  Visit Diagnoses       Encounter for gynecological examination (general) (routine) without abnormal findings    -  Primary    Encounter for screening mammogram for breast cancer        Relevant Orders    Mammo screening bilateral w 3d and cad            Orders Placed This Encounter   Procedures    Mammo screening bilateral w 3d and cad

## 2024-09-27 ENCOUNTER — ANNUAL EXAM (OUTPATIENT)
Dept: OBGYN CLINIC | Facility: MEDICAL CENTER | Age: 59
End: 2024-09-27
Payer: COMMERCIAL

## 2024-09-27 VITALS
HEIGHT: 65 IN | WEIGHT: 293 LBS | SYSTOLIC BLOOD PRESSURE: 142 MMHG | BODY MASS INDEX: 48.82 KG/M2 | DIASTOLIC BLOOD PRESSURE: 94 MMHG

## 2024-09-27 DIAGNOSIS — Z12.31 ENCOUNTER FOR SCREENING MAMMOGRAM FOR BREAST CANCER: ICD-10-CM

## 2024-09-27 DIAGNOSIS — Z01.419 ENCOUNTER FOR GYNECOLOGICAL EXAMINATION (GENERAL) (ROUTINE) WITHOUT ABNORMAL FINDINGS: Primary | ICD-10-CM

## 2024-09-27 PROCEDURE — S0612 ANNUAL GYNECOLOGICAL EXAMINA: HCPCS | Performed by: CLINICAL NURSE SPECIALIST

## 2024-10-16 ENCOUNTER — APPOINTMENT (OUTPATIENT)
Dept: LAB | Facility: MEDICAL CENTER | Age: 59
End: 2024-10-16
Payer: COMMERCIAL

## 2024-10-16 DIAGNOSIS — E03.9 HYPOTHYROIDISM, UNSPECIFIED TYPE: ICD-10-CM

## 2024-10-16 DIAGNOSIS — E78.2 MIXED HYPERLIPIDEMIA: ICD-10-CM

## 2024-10-16 LAB
ALBUMIN SERPL BCG-MCNC: 4.2 G/DL (ref 3.5–5)
ALP SERPL-CCNC: 68 U/L (ref 34–104)
ALT SERPL W P-5'-P-CCNC: 18 U/L (ref 7–52)
ANION GAP SERPL CALCULATED.3IONS-SCNC: 8 MMOL/L (ref 4–13)
AST SERPL W P-5'-P-CCNC: 20 U/L (ref 13–39)
BILIRUB SERPL-MCNC: 0.58 MG/DL (ref 0.2–1)
BUN SERPL-MCNC: 9 MG/DL (ref 5–25)
CALCIUM SERPL-MCNC: 9.1 MG/DL (ref 8.4–10.2)
CHLORIDE SERPL-SCNC: 100 MMOL/L (ref 96–108)
CHOLEST SERPL-MCNC: 175 MG/DL
CO2 SERPL-SCNC: 31 MMOL/L (ref 21–32)
CREAT SERPL-MCNC: 0.68 MG/DL (ref 0.6–1.3)
GFR SERPL CREATININE-BSD FRML MDRD: 96 ML/MIN/1.73SQ M
GLUCOSE P FAST SERPL-MCNC: 106 MG/DL (ref 65–99)
HDLC SERPL-MCNC: 45 MG/DL
LDLC SERPL CALC-MCNC: 101 MG/DL (ref 0–100)
NONHDLC SERPL-MCNC: 130 MG/DL
POTASSIUM SERPL-SCNC: 4.4 MMOL/L (ref 3.5–5.3)
PROT SERPL-MCNC: 7.6 G/DL (ref 6.4–8.4)
SODIUM SERPL-SCNC: 139 MMOL/L (ref 135–147)
TRIGL SERPL-MCNC: 144 MG/DL
TSH SERPL DL<=0.05 MIU/L-ACNC: 1.86 UIU/ML (ref 0.45–4.5)

## 2024-10-16 PROCEDURE — 84443 ASSAY THYROID STIM HORMONE: CPT

## 2024-10-16 PROCEDURE — 36415 COLL VENOUS BLD VENIPUNCTURE: CPT

## 2024-10-16 PROCEDURE — 80061 LIPID PANEL: CPT

## 2024-10-16 PROCEDURE — 80053 COMPREHEN METABOLIC PANEL: CPT

## 2024-10-24 ENCOUNTER — RA CDI HCC (OUTPATIENT)
Dept: OTHER | Facility: HOSPITAL | Age: 59
End: 2024-10-24

## 2024-10-31 ENCOUNTER — OFFICE VISIT (OUTPATIENT)
Dept: FAMILY MEDICINE CLINIC | Facility: CLINIC | Age: 59
End: 2024-10-31
Payer: COMMERCIAL

## 2024-10-31 VITALS
OXYGEN SATURATION: 96 % | WEIGHT: 293 LBS | HEIGHT: 65 IN | BODY MASS INDEX: 48.82 KG/M2 | HEART RATE: 98 BPM | SYSTOLIC BLOOD PRESSURE: 132 MMHG | TEMPERATURE: 97.7 F | DIASTOLIC BLOOD PRESSURE: 82 MMHG

## 2024-10-31 DIAGNOSIS — R73.01 ELEVATED FASTING GLUCOSE: ICD-10-CM

## 2024-10-31 DIAGNOSIS — E66.01 MORBID OBESITY (HCC): ICD-10-CM

## 2024-10-31 DIAGNOSIS — E78.2 MIXED HYPERLIPIDEMIA: ICD-10-CM

## 2024-10-31 DIAGNOSIS — E03.9 HYPOTHYROIDISM, UNSPECIFIED TYPE: ICD-10-CM

## 2024-10-31 DIAGNOSIS — E03.9 HYPOTHYROIDISM, UNSPECIFIED TYPE: Primary | ICD-10-CM

## 2024-10-31 DIAGNOSIS — Z13.0 SCREENING FOR DEFICIENCY ANEMIA: ICD-10-CM

## 2024-10-31 PROCEDURE — 99214 OFFICE O/P EST MOD 30 MIN: CPT | Performed by: PHYSICIAN ASSISTANT

## 2024-10-31 RX ORDER — LOVASTATIN 20 MG/1
20 TABLET ORAL
Qty: 90 TABLET | Refills: 1 | Status: SHIPPED | OUTPATIENT
Start: 2024-10-31

## 2024-10-31 RX ORDER — LEVOTHYROXINE SODIUM 100 UG/1
100 TABLET ORAL DAILY
Qty: 90 TABLET | Refills: 1 | Status: SHIPPED | OUTPATIENT
Start: 2024-10-31

## 2024-10-31 NOTE — ASSESSMENT & PLAN NOTE
TSH in normal range continue levothyroxine 100 mcg  Orders:    TSH, 3rd generation    T4    levothyroxine 100 mcg tablet; Take 1 tablet (100 mcg total) by mouth daily

## 2024-10-31 NOTE — ASSESSMENT & PLAN NOTE
Lipids at goal continue lovastatin 20 mg and low-fat diet  Orders:    Lipid panel    Comprehensive metabolic panel    lovastatin (MEVACOR) 20 mg tablet; Take 1 tablet (20 mg total) by mouth daily at bedtime

## 2024-10-31 NOTE — ASSESSMENT & PLAN NOTE
Orders:    TSH, 3rd generation    T4    levothyroxine 100 mcg tablet; Take 1 tablet (100 mcg total) by mouth daily

## 2024-10-31 NOTE — ASSESSMENT & PLAN NOTE
Orders:    Lipid panel    Comprehensive metabolic panel    lovastatin (MEVACOR) 20 mg tablet; Take 1 tablet (20 mg total) by mouth daily at bedtime

## 2024-10-31 NOTE — PROGRESS NOTES
Ambulatory Visit  Name: Keila Moy      : 1965      MRN: [id number]  Encounter Provider: Melisa Crum PA-C  Encounter Date: 10/31/2024   Encounter department: [facility]    Assessment & Plan  Hypothyroidism, unspecified type  TSH in normal range continue levothyroxine 100 mcg  Orders:    TSH, 3rd generation    T4    levothyroxine 100 mcg tablet; Take 1 tablet (100 mcg total) by mouth daily    Mixed hyperlipidemia  Lipids at goal continue lovastatin 20 mg and low-fat diet  Orders:    Lipid panel    Comprehensive metabolic panel    lovastatin (MEVACOR) 20 mg tablet; Take 1 tablet (20 mg total) by mouth daily at bedtime    Morbid obesity (HCC)    Continue diet and exercise to promote weight loss       Elevated fasting glucose    Orders:    Hemoglobin A1C    Screening for deficiency anemia    Orders:    CBC and differential    Mixed hyperlipidemia    Orders:    Lipid panel    Comprehensive metabolic panel    lovastatin (MEVACOR) 20 mg tablet; Take 1 tablet (20 mg total) by mouth daily at bedtime    Hypothyroidism, unspecified type    Orders:    TSH, 3rd generation    T4    levothyroxine 100 mcg tablet; Take 1 tablet (100 mcg total) by mouth daily      Depression Screening and Follow-up Plan: Patient was screened for depression during today's encounter. They screened negative with a PHQ-2 score of 0.      History of Present Illness     Patient presents in the office for follow-up chronic condition.  Patient has hypothyroidism.  Is on levothyroxine 100 mcg.  Her current TSH is in normal range.  Hyperlipidemia she is on low-fat diet and lovastatin 20 mg.  Her lipids are at goal.  Fasting glucose was elevated at 106.  Has lost over 9 pounds this summer.  Discussed continued diet and exercise to promote weight loss          Review of Systems   Constitutional:  Negative for activity change and unexpected weight change.   HENT:  Negative for ear pain and sore throat.    Eyes:  Negative for  "visual disturbance.   Respiratory:  Negative for cough, shortness of breath and wheezing.    Cardiovascular:  Negative for chest pain and leg swelling.   Gastrointestinal:  Negative for abdominal pain, blood in stool, constipation, diarrhea, nausea and vomiting.   Genitourinary:  Negative for difficulty urinating.   Musculoskeletal:  Negative for arthralgias and myalgias.   Skin:  Negative for rash.   Neurological:  Negative for dizziness, syncope, light-headedness and headaches.   Psychiatric/Behavioral:  Negative for self-injury, sleep disturbance and suicidal ideas. The patient is not nervous/anxious.            Objective     /82 (BP Location: Left arm, Patient Position: Sitting, Cuff Size: Large)   Pulse 98   Temp 97.7 °F (36.5 °C) (Temporal)   Ht 5' 5\" (1.651 m)   Wt (!) 138 kg (303 lb 3.2 oz)   LMP 10/01/2022 (Approximate)   SpO2 96%   Breastfeeding No   BMI 50.46 kg/m²     Physical Exam  Vitals and nursing note reviewed.   Constitutional:       General: She is not in acute distress.     Appearance: She is well-developed. She is obese. She is not ill-appearing or diaphoretic.   HENT:      Head: Normocephalic and atraumatic.      Right Ear: Tympanic membrane, ear canal and external ear normal.      Left Ear: Tympanic membrane, ear canal and external ear normal.      Mouth/Throat:      Pharynx: No posterior oropharyngeal erythema.   Eyes:      Conjunctiva/sclera: Conjunctivae normal.      Pupils: Pupils are equal, round, and reactive to light.   Neck:      Thyroid: No thyromegaly.      Vascular: No carotid bruit.   Cardiovascular:      Rate and Rhythm: Normal rate and regular rhythm.      Heart sounds: Normal heart sounds. No murmur heard.     No friction rub.   Pulmonary:      Effort: Pulmonary effort is normal. No respiratory distress.      Breath sounds: Normal breath sounds. No wheezing.   Abdominal:      General: Bowel sounds are normal. There is no distension.      Palpations: Abdomen is " soft. There is no mass.      Tenderness: There is no abdominal tenderness.   Musculoskeletal:      Right lower leg: No edema.      Left lower leg: No edema.   Lymphadenopathy:      Cervical: No cervical adenopathy.   Skin:     General: Skin is warm and dry.   Neurological:      General: No focal deficit present.      Mental Status: She is alert and oriented to person, place, and time.   Psychiatric:         Mood and Affect: Mood normal.         Behavior: Behavior normal.         Thought Content: Thought content normal.         Judgment: Judgment normal.

## 2025-04-05 DIAGNOSIS — E78.2 MIXED HYPERLIPIDEMIA: ICD-10-CM

## 2025-04-05 RX ORDER — LOVASTATIN 20 MG/1
20 TABLET ORAL
Qty: 90 TABLET | Refills: 1 | Status: SHIPPED | OUTPATIENT
Start: 2025-04-05

## 2025-04-10 ENCOUNTER — APPOINTMENT (OUTPATIENT)
Dept: LAB | Facility: MEDICAL CENTER | Age: 60
End: 2025-04-10
Payer: COMMERCIAL

## 2025-04-10 LAB
ALBUMIN SERPL BCG-MCNC: 4.3 G/DL (ref 3.5–5)
ALP SERPL-CCNC: 66 U/L (ref 34–104)
ALT SERPL W P-5'-P-CCNC: 16 U/L (ref 7–52)
ANION GAP SERPL CALCULATED.3IONS-SCNC: 10 MMOL/L (ref 4–13)
AST SERPL W P-5'-P-CCNC: 15 U/L (ref 13–39)
BASOPHILS # BLD AUTO: 0.06 THOUSANDS/ÂΜL (ref 0–0.1)
BASOPHILS NFR BLD AUTO: 1 % (ref 0–1)
BILIRUB SERPL-MCNC: 0.62 MG/DL (ref 0.2–1)
BUN SERPL-MCNC: 11 MG/DL (ref 5–25)
CALCIUM SERPL-MCNC: 9.3 MG/DL (ref 8.4–10.2)
CHLORIDE SERPL-SCNC: 100 MMOL/L (ref 96–108)
CHOLEST SERPL-MCNC: 174 MG/DL (ref ?–200)
CO2 SERPL-SCNC: 29 MMOL/L (ref 21–32)
CREAT SERPL-MCNC: 0.69 MG/DL (ref 0.6–1.3)
EOSINOPHIL # BLD AUTO: 0.21 THOUSAND/ÂΜL (ref 0–0.61)
EOSINOPHIL NFR BLD AUTO: 3 % (ref 0–6)
ERYTHROCYTE [DISTWIDTH] IN BLOOD BY AUTOMATED COUNT: 12.8 % (ref 11.6–15.1)
EST. AVERAGE GLUCOSE BLD GHB EST-MCNC: 146 MG/DL
GFR SERPL CREATININE-BSD FRML MDRD: 95 ML/MIN/1.73SQ M
GLUCOSE P FAST SERPL-MCNC: 86 MG/DL (ref 65–99)
HBA1C MFR BLD: 6.7 %
HCT VFR BLD AUTO: 46.8 % (ref 34.8–46.1)
HDLC SERPL-MCNC: 44 MG/DL
HGB BLD-MCNC: 15 G/DL (ref 11.5–15.4)
IMM GRANULOCYTES # BLD AUTO: 0.03 THOUSAND/UL (ref 0–0.2)
IMM GRANULOCYTES NFR BLD AUTO: 0 % (ref 0–2)
LDLC SERPL CALC-MCNC: 101 MG/DL (ref 0–100)
LYMPHOCYTES # BLD AUTO: 2.03 THOUSANDS/ÂΜL (ref 0.6–4.47)
LYMPHOCYTES NFR BLD AUTO: 30 % (ref 14–44)
MCH RBC QN AUTO: 31.4 PG (ref 26.8–34.3)
MCHC RBC AUTO-ENTMCNC: 32.1 G/DL (ref 31.4–37.4)
MCV RBC AUTO: 98 FL (ref 82–98)
MONOCYTES # BLD AUTO: 0.72 THOUSAND/ÂΜL (ref 0.17–1.22)
MONOCYTES NFR BLD AUTO: 10 % (ref 4–12)
NEUTROPHILS # BLD AUTO: 3.84 THOUSANDS/ÂΜL (ref 1.85–7.62)
NEUTS SEG NFR BLD AUTO: 56 % (ref 43–75)
NONHDLC SERPL-MCNC: 130 MG/DL
NRBC BLD AUTO-RTO: 0 /100 WBCS
PLATELET # BLD AUTO: 245 THOUSANDS/UL (ref 149–390)
PMV BLD AUTO: 9.7 FL (ref 8.9–12.7)
POTASSIUM SERPL-SCNC: 4.4 MMOL/L (ref 3.5–5.3)
PROT SERPL-MCNC: 7.5 G/DL (ref 6.4–8.4)
RBC # BLD AUTO: 4.78 MILLION/UL (ref 3.81–5.12)
SODIUM SERPL-SCNC: 139 MMOL/L (ref 135–147)
T4 SERPL-MCNC: 10.62 UG/DL (ref 6.09–12.23)
TRIGL SERPL-MCNC: 144 MG/DL (ref ?–150)
TSH SERPL DL<=0.05 MIU/L-ACNC: 1.97 UIU/ML (ref 0.45–4.5)
WBC # BLD AUTO: 6.89 THOUSAND/UL (ref 4.31–10.16)

## 2025-04-11 ENCOUNTER — RESULTS FOLLOW-UP (OUTPATIENT)
Dept: FAMILY MEDICINE CLINIC | Facility: CLINIC | Age: 60
End: 2025-04-11

## 2025-04-18 ENCOUNTER — RA CDI HCC (OUTPATIENT)
Dept: OTHER | Facility: HOSPITAL | Age: 60
End: 2025-04-18

## 2025-04-24 PROBLEM — E11.9 TYPE 2 DIABETES MELLITUS WITHOUT COMPLICATION (HCC): Status: ACTIVE | Noted: 2025-04-24

## 2025-04-25 ENCOUNTER — OFFICE VISIT (OUTPATIENT)
Dept: FAMILY MEDICINE CLINIC | Facility: CLINIC | Age: 60
End: 2025-04-25
Payer: COMMERCIAL

## 2025-04-25 VITALS
BODY MASS INDEX: 48.82 KG/M2 | WEIGHT: 293 LBS | HEIGHT: 65 IN | DIASTOLIC BLOOD PRESSURE: 82 MMHG | TEMPERATURE: 98.1 F | OXYGEN SATURATION: 93 % | SYSTOLIC BLOOD PRESSURE: 140 MMHG | HEART RATE: 94 BPM

## 2025-04-25 DIAGNOSIS — E78.2 MIXED HYPERLIPIDEMIA: ICD-10-CM

## 2025-04-25 DIAGNOSIS — E03.9 HYPOTHYROIDISM, UNSPECIFIED TYPE: ICD-10-CM

## 2025-04-25 DIAGNOSIS — E66.01 MORBID OBESITY (HCC): ICD-10-CM

## 2025-04-25 DIAGNOSIS — E11.9 TYPE 2 DIABETES MELLITUS WITHOUT COMPLICATION, WITHOUT LONG-TERM CURRENT USE OF INSULIN (HCC): ICD-10-CM

## 2025-04-25 DIAGNOSIS — E03.9 HYPOTHYROIDISM, UNSPECIFIED TYPE: Primary | ICD-10-CM

## 2025-04-25 DIAGNOSIS — I89.0 LYMPHEDEMA OF BOTH LOWER EXTREMITIES: ICD-10-CM

## 2025-04-25 PROCEDURE — 99214 OFFICE O/P EST MOD 30 MIN: CPT | Performed by: PHYSICIAN ASSISTANT

## 2025-04-25 RX ORDER — LEVOTHYROXINE SODIUM 100 UG/1
100 TABLET ORAL DAILY
Qty: 90 TABLET | Refills: 1 | Status: SHIPPED | OUTPATIENT
Start: 2025-04-25

## 2025-04-25 RX ORDER — LOVASTATIN 20 MG/1
20 TABLET ORAL
Qty: 90 TABLET | Refills: 1 | Status: SHIPPED | OUTPATIENT
Start: 2025-04-25

## 2025-04-25 NOTE — ASSESSMENT & PLAN NOTE
Lab Results   Component Value Date    HGBA1C 6.7 (H) 04/10/2025     Low-carb low sugar diet and exercise  Orders:    CBC and differential; Future    Hemoglobin A1C; Future    Albumin / creatinine urine ratio; Future

## 2025-04-25 NOTE — ASSESSMENT & PLAN NOTE
TSH in normal range continue levothyroxine 100 mcg  Orders:    TSH, 3rd generation; Future    T4; Future    levothyroxine 100 mcg tablet; Take 1 tablet (100 mcg total) by mouth daily

## 2025-04-25 NOTE — ASSESSMENT & PLAN NOTE
Orders:    Comprehensive metabolic panel; Future    Lipid panel; Future    lovastatin (MEVACOR) 20 mg tablet; Take 1 tablet (20 mg total) by mouth daily at bedtime

## 2025-04-25 NOTE — ASSESSMENT & PLAN NOTE
Lipids at goal continue statin therapy and low-fat diet  Orders:    Comprehensive metabolic panel; Future    Lipid panel; Future    lovastatin (MEVACOR) 20 mg tablet; Take 1 tablet (20 mg total) by mouth daily at bedtime

## 2025-04-25 NOTE — PROGRESS NOTES
Diabetic Foot Exam    Patient's shoes and socks removed.    Right Foot/Ankle   Right Foot Inspection  Skin Exam: skin intact, dry skin, callus and callus. No ulcer.     Toe Exam: right toe deformity.     Sensory   Vibration: intact  Monofilament testing: intact    Vascular  The right DP pulse is 2+.     Left Foot/Ankle  Left Foot Inspection  Skin Exam: skin intact, dry skin and callus. No ulcer.     Sensory   Vibration: intact  Monofilament testing: intact    Vascular  The left DP pulse is 2+.     Assign Risk Category  Deformity present  No loss of protective sensation  No weak pulses  Risk: 0  Name: Keila Moy      : 1965      MRN: [id number]  Encounter Provider: Melisa Crum PA-C  Encounter Date: 2025   Encounter department: [facility]  :  Assessment & Plan  Hypothyroidism, unspecified type  TSH in normal range continue levothyroxine 100 mcg  Orders:    TSH, 3rd generation; Future    T4; Future    levothyroxine 100 mcg tablet; Take 1 tablet (100 mcg total) by mouth daily    Lymphedema of both lower extremities  Currently no edema       Mixed hyperlipidemia  Lipids at goal continue statin therapy and low-fat diet  Orders:    Comprehensive metabolic panel; Future    Lipid panel; Future    lovastatin (MEVACOR) 20 mg tablet; Take 1 tablet (20 mg total) by mouth daily at bedtime    Morbid obesity (HCC)    Diet and exercise to promote weight loss       Type 2 diabetes mellitus without complication, without long-term current use of insulin (HCC)    Lab Results   Component Value Date    HGBA1C 6.7 (H) 04/10/2025     Low-carb low sugar diet and exercise  Orders:    CBC and differential; Future    Hemoglobin A1C; Future    Albumin / creatinine urine ratio; Future    Mixed hyperlipidemia    Orders:    Comprehensive metabolic panel; Future    Lipid panel; Future    lovastatin (MEVACOR) 20 mg tablet; Take 1 tablet (20 mg total) by mouth daily at bedtime    Hypothyroidism, unspecified  "type    Orders:    TSH, 3rd generation; Future    T4; Future    levothyroxine 100 mcg tablet; Take 1 tablet (100 mcg total) by mouth daily           History of Present Illness   Patient presents to the office for follow-up chronic conditions.  Patient has hypothyroidism.  Her current is levothyroxine 100 mcg.  Her TSH and T4 are normal.  Hyperlipidemia she is on lovastatin 20 mg.  Panel at goal hepatic functions are normal.  Patient is overweight.  Her last excellent glucose was elevated.  Hemoglobin A1c was 6.7.  We discussed low-carb low sugar diet.  She would also need to begin exercise.  Does not want to check blood sugar at this time.  She does not want medication at this time.      Review of Systems   Constitutional:  Negative for activity change and unexpected weight change.   HENT:  Negative for ear pain and sore throat.    Eyes:  Negative for visual disturbance.   Respiratory:  Negative for cough, shortness of breath and wheezing.    Cardiovascular:  Negative for chest pain and leg swelling.   Gastrointestinal:  Negative for abdominal pain, blood in stool, constipation, diarrhea, nausea and vomiting.   Genitourinary:  Negative for difficulty urinating.   Musculoskeletal:  Negative for arthralgias and myalgias.   Skin:  Negative for rash.   Neurological:  Negative for dizziness, syncope, light-headedness and headaches.   Psychiatric/Behavioral:  Negative for self-injury, sleep disturbance and suicidal ideas. The patient is not nervous/anxious.        Objective   /82 (BP Location: Right arm, Patient Position: Sitting, Cuff Size: Large)   Pulse 94   Temp 98.1 °F (36.7 °C) (Temporal)   Ht 5' 5\" (1.651 m)   Wt (!) 138 kg (304 lb 6.4 oz)   LMP 10/01/2022 (Approximate)   SpO2 93%   Breastfeeding No   BMI 50.65 kg/m²      Physical Exam  Vitals and nursing note reviewed.   Constitutional:       General: She is not in acute distress.     Appearance: She is well-developed. She is obese. She is not " ill-appearing or diaphoretic.   HENT:      Head: Normocephalic and atraumatic.      Right Ear: Tympanic membrane, ear canal and external ear normal.      Left Ear: Tympanic membrane, ear canal and external ear normal.   Eyes:      Conjunctiva/sclera: Conjunctivae normal.      Pupils: Pupils are equal, round, and reactive to light.   Neck:      Thyroid: No thyromegaly.      Vascular: No carotid bruit.   Cardiovascular:      Rate and Rhythm: Normal rate and regular rhythm.      Pulses: no weak pulses.           Dorsalis pedis pulses are 2+ on the right side and 2+ on the left side.      Heart sounds: Normal heart sounds. No murmur heard.     No friction rub.   Pulmonary:      Effort: Pulmonary effort is normal. No respiratory distress.      Breath sounds: Normal breath sounds. No wheezing.   Abdominal:      General: Bowel sounds are normal. There is no distension.      Palpations: Abdomen is soft. There is no mass.      Tenderness: There is no abdominal tenderness.   Musculoskeletal:      Right lower leg: No edema.      Left lower leg: No edema.      Comments: Varicose  veins   bilateral  lower  legs   Feet:      Right foot:      Skin integrity: Callus and dry skin present. No ulcer.      Left foot:      Skin integrity: Callus and dry skin present. No ulcer.   Lymphadenopathy:      Cervical: No cervical adenopathy.   Skin:     General: Skin is warm and dry.   Neurological:      Mental Status: She is alert and oriented to person, place, and time.   Psychiatric:         Mood and Affect: Mood normal.         Behavior: Behavior normal.         Thought Content: Thought content normal.         Judgment: Judgment normal.

## 2025-04-25 NOTE — ASSESSMENT & PLAN NOTE
Orders:    TSH, 3rd generation; Future    T4; Future    levothyroxine 100 mcg tablet; Take 1 tablet (100 mcg total) by mouth daily

## 2025-06-10 ENCOUNTER — HOSPITAL ENCOUNTER (OUTPATIENT)
Dept: RADIOLOGY | Facility: MEDICAL CENTER | Age: 60
Discharge: HOME | End: 2025-06-10
Payer: COMMERCIAL

## 2025-06-10 VITALS — HEIGHT: 65 IN | BODY MASS INDEX: 48.82 KG/M2 | WEIGHT: 293 LBS

## 2025-06-10 DIAGNOSIS — Z12.31 ENCOUNTER FOR SCREENING MAMMOGRAM FOR BREAST CANCER: ICD-10-CM

## 2025-06-10 PROCEDURE — 77063 BREAST TOMOSYNTHESIS BI: CPT

## 2025-06-10 PROCEDURE — 77067 SCR MAMMO BI INCL CAD: CPT

## 2025-06-13 ENCOUNTER — RESULTS FOLLOW-UP (OUTPATIENT)
Dept: OBGYN CLINIC | Facility: MEDICAL CENTER | Age: 60
End: 2025-06-13